# Patient Record
Sex: FEMALE | Race: WHITE | NOT HISPANIC OR LATINO | Employment: FULL TIME | URBAN - METROPOLITAN AREA
[De-identification: names, ages, dates, MRNs, and addresses within clinical notes are randomized per-mention and may not be internally consistent; named-entity substitution may affect disease eponyms.]

---

## 2022-06-24 ENCOUNTER — OFFICE VISIT (OUTPATIENT)
Dept: PRIMARY CARE CLINIC | Facility: CLINIC | Age: 42
End: 2022-06-24
Payer: MEDICAID

## 2022-06-24 VITALS
HEIGHT: 62 IN | TEMPERATURE: 97 F | RESPIRATION RATE: 20 BRPM | BODY MASS INDEX: 47.18 KG/M2 | HEART RATE: 64 BPM | OXYGEN SATURATION: 98 % | SYSTOLIC BLOOD PRESSURE: 126 MMHG | WEIGHT: 256.38 LBS | DIASTOLIC BLOOD PRESSURE: 60 MMHG

## 2022-06-24 DIAGNOSIS — Z13.39 ADHD (ATTENTION DEFICIT HYPERACTIVITY DISORDER) EVALUATION: ICD-10-CM

## 2022-06-24 DIAGNOSIS — Z12.31 BREAST CANCER SCREENING BY MAMMOGRAM: ICD-10-CM

## 2022-06-24 DIAGNOSIS — Z01.419 WELL WOMAN EXAM: ICD-10-CM

## 2022-06-24 DIAGNOSIS — Z11.59 ENCOUNTER FOR HEPATITIS C SCREENING TEST FOR LOW RISK PATIENT: ICD-10-CM

## 2022-06-24 DIAGNOSIS — E66.01 CLASS 3 SEVERE OBESITY WITHOUT SERIOUS COMORBIDITY WITH BODY MASS INDEX (BMI) OF 45.0 TO 49.9 IN ADULT, UNSPECIFIED OBESITY TYPE: ICD-10-CM

## 2022-06-24 DIAGNOSIS — Z11.4 ENCOUNTER FOR SCREENING FOR HIV: ICD-10-CM

## 2022-06-24 DIAGNOSIS — Z00.01 ENCOUNTER FOR GENERAL ADULT MEDICAL EXAMINATION WITH ABNORMAL FINDINGS: Primary | ICD-10-CM

## 2022-06-24 PROCEDURE — 3008F PR BODY MASS INDEX (BMI) DOCUMENTED: ICD-10-PCS | Mod: CPTII,,, | Performed by: FAMILY MEDICINE

## 2022-06-24 PROCEDURE — 3074F PR MOST RECENT SYSTOLIC BLOOD PRESSURE < 130 MM HG: ICD-10-PCS | Mod: CPTII,,, | Performed by: FAMILY MEDICINE

## 2022-06-24 PROCEDURE — 3074F SYST BP LT 130 MM HG: CPT | Mod: CPTII,,, | Performed by: FAMILY MEDICINE

## 2022-06-24 PROCEDURE — 3078F DIAST BP <80 MM HG: CPT | Mod: CPTII,,, | Performed by: FAMILY MEDICINE

## 2022-06-24 PROCEDURE — 99386 PR PREVENTIVE VISIT,NEW,40-64: ICD-10-PCS | Mod: S$PBB,,, | Performed by: FAMILY MEDICINE

## 2022-06-24 PROCEDURE — 99999 PR PBB SHADOW E&M-NEW PATIENT-LVL V: CPT | Mod: PBBFAC,,, | Performed by: FAMILY MEDICINE

## 2022-06-24 PROCEDURE — 99386 PREV VISIT NEW AGE 40-64: CPT | Mod: S$PBB,,, | Performed by: FAMILY MEDICINE

## 2022-06-24 PROCEDURE — 3008F BODY MASS INDEX DOCD: CPT | Mod: CPTII,,, | Performed by: FAMILY MEDICINE

## 2022-06-24 PROCEDURE — 99999 PR PBB SHADOW E&M-NEW PATIENT-LVL V: ICD-10-PCS | Mod: PBBFAC,,, | Performed by: FAMILY MEDICINE

## 2022-06-24 PROCEDURE — 1159F PR MEDICATION LIST DOCUMENTED IN MEDICAL RECORD: ICD-10-PCS | Mod: CPTII,,, | Performed by: FAMILY MEDICINE

## 2022-06-24 PROCEDURE — 99205 OFFICE O/P NEW HI 60 MIN: CPT | Mod: PBBFAC,PN | Performed by: FAMILY MEDICINE

## 2022-06-24 PROCEDURE — 1160F RVW MEDS BY RX/DR IN RCRD: CPT | Mod: CPTII,,, | Performed by: FAMILY MEDICINE

## 2022-06-24 PROCEDURE — 3078F PR MOST RECENT DIASTOLIC BLOOD PRESSURE < 80 MM HG: ICD-10-PCS | Mod: CPTII,,, | Performed by: FAMILY MEDICINE

## 2022-06-24 PROCEDURE — 1160F PR REVIEW ALL MEDS BY PRESCRIBER/CLIN PHARMACIST DOCUMENTED: ICD-10-PCS | Mod: CPTII,,, | Performed by: FAMILY MEDICINE

## 2022-06-24 PROCEDURE — 1159F MED LIST DOCD IN RCRD: CPT | Mod: CPTII,,, | Performed by: FAMILY MEDICINE

## 2022-06-24 NOTE — PROGRESS NOTES
"Subjective:       Patient ID: Dorian Ho is a 41 y.o. female.    Chief Complaint: annual and ADD         History of Present Illness:   Dorian Ho 41 y.o. female presents today with   Well Adult Physical: Patient here for a comprehensive physical exam.The patient reports problems - thinks she has ADD and anxiety and wants to be evaluated for ADD. Denies anyother sxs.  Do you take any herbs or supplements that were not prescribed by a doctor? no Are you taking calcium supplements? no Are you taking aspirin daily? not applicable   History:  Needs pap smear and mammo.  She has not seen a provider since her last child        History reviewed. No pertinent past medical history.  History reviewed. No pertinent family history.  Social History     Socioeconomic History    Marital status: Single   Tobacco Use    Smoking status: Never Smoker    Smokeless tobacco: Never Used     No outpatient encounter medications on file as of 6/24/2022.     No facility-administered encounter medications on file as of 6/24/2022.       Review of Systems    Review of Systems      A complete 10 point ROS was completed and are positive as per above HPI.    Otherwise negative for fever, diplopia, chest pain, shortness of breath, vomiting, blood in urine, joint pain, skin rash, seizures and unusual bleeding.     Objective:      /60 (BP Location: Left arm, Patient Position: Sitting, BP Method: Large (Manual))   Pulse 64   Temp 96.8 °F (36 °C) (Temporal)   Resp 20   Ht 5' 2" (1.575 m)   Wt 116.3 kg (256 lb 6.4 oz)   LMP 06/23/2022 (Exact Date)   SpO2 98%   BMI 46.90 kg/m²   Physical Exam  Psychiatric:         Attention and Perception: Attention normal.         Thought Content: Thought content is not paranoid or delusional. Thought content does not include homicidal or suicidal ideation. Thought content does not include homicidal or suicidal plan.         CONSTITUTIONAL: No apparent distress. Appears comfortable. Does not appear " acutely ill or septic. Appears adequately hydrated.  CARDIOVASCULAR: No perioral cyanosis  PULMONARY: Breathing unlabored. No retractions Chest expansion grossly normal.  PSYCHIATRIC: Alert and conversant and grossly oriented. Mood is grossly neutral. Affect appropriate. Judgment and insight grossly intact.  NEUROLOGIC: No focal sensory deficits reported.   No results found for this or any previous visit.  Assessment:       1. Encounter for general adult medical examination with abnormal findings    2. Encounter for screening for HIV    3. Encounter for hepatitis C screening test for low risk patient    4. Breast cancer screening by mammogram    5. Class 3 severe obesity without serious comorbidity with body mass index (BMI) of 45.0 to 49.9 in adult, unspecified obesity type    6. ADHD (attention deficit hyperactivity disorder) evaluation    7. Well woman exam        Plan:   Encounter for general adult medical examination with abnormal findings  -     CBC Auto Differential; Future; Expected date: 06/24/2022  -     Comprehensive Metabolic Panel; Future; Expected date: 06/24/2022  -     Lipid Panel; Future; Expected date: 06/24/2022    Encounter for screening for HIV  -     HIV 1/2 Ag/Ab (4th Gen); Future; Expected date: 06/24/2022    Encounter for hepatitis C screening test for low risk patient  -     Hepatitis C Antibody; Future; Expected date: 06/24/2022    Breast cancer screening by mammogram  -     Mammo Digital Screening Bilat w/ Vinnie; Future; Expected date: 06/24/2022    Class 3 severe obesity without serious comorbidity with body mass index (BMI) of 45.0 to 49.9 in adult, unspecified obesity type  -     Hemoglobin A1C; Future; Expected date: 06/24/2022  -     TSH; Future; Expected date: 06/24/2022    ADHD (attention deficit hyperactivity disorder) evaluation  -     Ambulatory referral/consult to Primary Care Behavioral Health (Non-Opioids); Future; Expected date: 07/01/2022    Well woman exam  -     Ambulatory  referral/consult to Gynecology; Future; Expected date: 07/01/2022    Benign exam  I have reviewed all of the patient's clinical history available in care everywhere and Epic and have utilized this in my evaluation and management recommendations today.   Treatment options and alternatives were discussed with the patient. Patient was given ample time to ask questions. All questions were answered. Voices understanding and acceptance of this advice. Will call back if any further questions or concerns.    Melissa Philippe MD

## 2022-06-27 ENCOUNTER — PATIENT OUTREACH (OUTPATIENT)
Dept: BEHAVIORAL HEALTH | Facility: CLINIC | Age: 42
End: 2022-06-27
Payer: MEDICAID

## 2022-06-27 NOTE — PROGRESS NOTES
Casework    Work Queue - Intake: Scheduled evaluation only with patient. Patient prefers to be seen in person.

## 2022-06-29 ENCOUNTER — LAB VISIT (OUTPATIENT)
Dept: LAB | Facility: HOSPITAL | Age: 42
End: 2022-06-29
Attending: FAMILY MEDICINE
Payer: MEDICAID

## 2022-06-29 DIAGNOSIS — E66.01 CLASS 3 SEVERE OBESITY WITHOUT SERIOUS COMORBIDITY WITH BODY MASS INDEX (BMI) OF 45.0 TO 49.9 IN ADULT, UNSPECIFIED OBESITY TYPE: ICD-10-CM

## 2022-06-29 DIAGNOSIS — Z11.59 ENCOUNTER FOR HEPATITIS C SCREENING TEST FOR LOW RISK PATIENT: ICD-10-CM

## 2022-06-29 DIAGNOSIS — Z00.01 ENCOUNTER FOR GENERAL ADULT MEDICAL EXAMINATION WITH ABNORMAL FINDINGS: ICD-10-CM

## 2022-06-29 DIAGNOSIS — Z11.4 ENCOUNTER FOR SCREENING FOR HIV: ICD-10-CM

## 2022-06-29 LAB
ALBUMIN SERPL BCP-MCNC: 3.4 G/DL (ref 3.5–5.2)
ALP SERPL-CCNC: 75 U/L (ref 55–135)
ALT SERPL W/O P-5'-P-CCNC: 8 U/L (ref 10–44)
ANION GAP SERPL CALC-SCNC: 8 MMOL/L (ref 8–16)
AST SERPL-CCNC: 10 U/L (ref 10–40)
BASOPHILS # BLD AUTO: 0.03 K/UL (ref 0–0.2)
BASOPHILS NFR BLD: 0.5 % (ref 0–1.9)
BILIRUB SERPL-MCNC: 0.3 MG/DL (ref 0.1–1)
BUN SERPL-MCNC: 11 MG/DL (ref 6–20)
CALCIUM SERPL-MCNC: 8.5 MG/DL (ref 8.7–10.5)
CHLORIDE SERPL-SCNC: 108 MMOL/L (ref 95–110)
CHOLEST SERPL-MCNC: 121 MG/DL (ref 120–199)
CHOLEST/HDLC SERPL: 3.2 {RATIO} (ref 2–5)
CO2 SERPL-SCNC: 23 MMOL/L (ref 23–29)
CREAT SERPL-MCNC: 0.6 MG/DL (ref 0.5–1.4)
DIFFERENTIAL METHOD: ABNORMAL
EOSINOPHIL # BLD AUTO: 0.2 K/UL (ref 0–0.5)
EOSINOPHIL NFR BLD: 2.7 % (ref 0–8)
ERYTHROCYTE [DISTWIDTH] IN BLOOD BY AUTOMATED COUNT: 13.7 % (ref 11.5–14.5)
EST. GFR  (AFRICAN AMERICAN): >60 ML/MIN/1.73 M^2
EST. GFR  (NON AFRICAN AMERICAN): >60 ML/MIN/1.73 M^2
ESTIMATED AVG GLUCOSE: 91 MG/DL (ref 68–131)
GLUCOSE SERPL-MCNC: 77 MG/DL (ref 70–110)
HBA1C MFR BLD: 4.8 % (ref 4–5.6)
HCT VFR BLD AUTO: 37.6 % (ref 37–48.5)
HDLC SERPL-MCNC: 38 MG/DL (ref 40–75)
HDLC SERPL: 31.4 % (ref 20–50)
HGB BLD-MCNC: 11.8 G/DL (ref 12–16)
IMM GRANULOCYTES # BLD AUTO: 0.02 K/UL (ref 0–0.04)
IMM GRANULOCYTES NFR BLD AUTO: 0.4 % (ref 0–0.5)
LDLC SERPL CALC-MCNC: 68.6 MG/DL (ref 63–159)
LYMPHOCYTES # BLD AUTO: 1.7 K/UL (ref 1–4.8)
LYMPHOCYTES NFR BLD: 30.1 % (ref 18–48)
MCH RBC QN AUTO: 26.2 PG (ref 27–31)
MCHC RBC AUTO-ENTMCNC: 31.4 G/DL (ref 32–36)
MCV RBC AUTO: 84 FL (ref 82–98)
MONOCYTES # BLD AUTO: 0.3 K/UL (ref 0.3–1)
MONOCYTES NFR BLD: 5.2 % (ref 4–15)
NEUTROPHILS # BLD AUTO: 3.4 K/UL (ref 1.8–7.7)
NEUTROPHILS NFR BLD: 61.1 % (ref 38–73)
NONHDLC SERPL-MCNC: 83 MG/DL
NRBC BLD-RTO: 0 /100 WBC
PLATELET # BLD AUTO: 368 K/UL (ref 150–450)
PMV BLD AUTO: 10.6 FL (ref 9.2–12.9)
POTASSIUM SERPL-SCNC: 4 MMOL/L (ref 3.5–5.1)
PROT SERPL-MCNC: 6.9 G/DL (ref 6–8.4)
RBC # BLD AUTO: 4.5 M/UL (ref 4–5.4)
SODIUM SERPL-SCNC: 139 MMOL/L (ref 136–145)
TRIGL SERPL-MCNC: 72 MG/DL (ref 30–150)
TSH SERPL DL<=0.005 MIU/L-ACNC: 0.94 UIU/ML (ref 0.4–4)
WBC # BLD AUTO: 5.54 K/UL (ref 3.9–12.7)

## 2022-06-29 PROCEDURE — 83036 HEMOGLOBIN GLYCOSYLATED A1C: CPT | Performed by: FAMILY MEDICINE

## 2022-06-29 PROCEDURE — 85025 COMPLETE CBC W/AUTO DIFF WBC: CPT | Performed by: FAMILY MEDICINE

## 2022-06-29 PROCEDURE — 86803 HEPATITIS C AB TEST: CPT | Performed by: FAMILY MEDICINE

## 2022-06-29 PROCEDURE — 87389 HIV-1 AG W/HIV-1&-2 AB AG IA: CPT | Performed by: FAMILY MEDICINE

## 2022-06-29 PROCEDURE — 84443 ASSAY THYROID STIM HORMONE: CPT | Performed by: FAMILY MEDICINE

## 2022-06-29 PROCEDURE — 80061 LIPID PANEL: CPT | Performed by: FAMILY MEDICINE

## 2022-06-29 PROCEDURE — 80053 COMPREHEN METABOLIC PANEL: CPT | Performed by: FAMILY MEDICINE

## 2022-06-29 PROCEDURE — 36415 COLL VENOUS BLD VENIPUNCTURE: CPT | Mod: PN | Performed by: FAMILY MEDICINE

## 2022-06-30 ENCOUNTER — TELEPHONE (OUTPATIENT)
Dept: PRIMARY CARE CLINIC | Facility: CLINIC | Age: 42
End: 2022-06-30
Payer: MEDICAID

## 2022-06-30 LAB
HCV AB SERPL QL IA: NEGATIVE
HIV 1+2 AB+HIV1 P24 AG SERPL QL IA: NEGATIVE

## 2022-06-30 NOTE — TELEPHONE ENCOUNTER
----- Message from Melissa Philippe MD sent at 6/30/2022  8:25 AM CDT -----  I have reviewed all your lab results.   Blood count slight anemia,   kidney function, liver function, electrolytes, cholesterol and thyroid function are all normal.  Your A1C is normal, therefore you do not have diabetes.  If you have an appt for result review, please keep it, so we can discuss details and plan of care.  Please do not hesitate to contact us if you have any questions.

## 2022-06-30 NOTE — PROGRESS NOTES
I have reviewed all your lab results.   Blood count slight anemia,   kidney function, liver function, electrolytes, cholesterol and thyroid function are all normal.  Your A1C is normal, therefore you do not have diabetes.  If you have an appt for result review, please keep it, so we can discuss details and plan of care.  Please do not hesitate to contact us if you have any questions.

## 2022-07-06 NOTE — PROGRESS NOTES
Age appropriate HIV screen is negative   Age appropriate Hep C screen is negative. No further action required.

## 2022-07-12 ENCOUNTER — PATIENT OUTREACH (OUTPATIENT)
Dept: BEHAVIORAL HEALTH | Facility: CLINIC | Age: 42
End: 2022-07-12
Payer: MEDICAID

## 2022-07-13 ENCOUNTER — HOSPITAL ENCOUNTER (OUTPATIENT)
Dept: RADIOLOGY | Facility: HOSPITAL | Age: 42
Discharge: HOME | End: 2022-07-13
Attending: FAMILY MEDICINE
Payer: MEDICAID

## 2022-07-13 ENCOUNTER — TELEPHONE (OUTPATIENT)
Dept: PRIMARY CARE CLINIC | Facility: CLINIC | Age: 42
End: 2022-07-13
Payer: MEDICAID

## 2022-07-13 ENCOUNTER — OFFICE VISIT (OUTPATIENT)
Dept: BEHAVIORAL HEALTH | Facility: CLINIC | Age: 42
End: 2022-07-13
Payer: MEDICAID

## 2022-07-13 VITALS — HEIGHT: 62 IN | WEIGHT: 255.75 LBS | BODY MASS INDEX: 47.06 KG/M2

## 2022-07-13 DIAGNOSIS — F41.9 ANXIETY AND DEPRESSION: Primary | ICD-10-CM

## 2022-07-13 DIAGNOSIS — Z12.31 BREAST CANCER SCREENING BY MAMMOGRAM: ICD-10-CM

## 2022-07-13 DIAGNOSIS — F32.A ANXIETY AND DEPRESSION: Primary | ICD-10-CM

## 2022-07-13 DIAGNOSIS — F90.9 ATTENTION DEFICIT HYPERACTIVITY DISORDER (ADHD), UNSPECIFIED ADHD TYPE: ICD-10-CM

## 2022-07-13 PROCEDURE — 77067 SCR MAMMO BI INCL CAD: CPT | Mod: TC

## 2022-07-13 PROCEDURE — 90791 PSYCH DIAGNOSTIC EVALUATION: CPT | Mod: AJ,HB,, | Performed by: SOCIAL WORKER

## 2022-07-13 PROCEDURE — 99999 PR PBB SHADOW E&M-EST. PATIENT-LVL I: ICD-10-PCS | Mod: PBBFAC,,, | Performed by: SOCIAL WORKER

## 2022-07-13 PROCEDURE — 77063 BREAST TOMOSYNTHESIS BI: CPT | Mod: 26,,, | Performed by: RADIOLOGY

## 2022-07-13 PROCEDURE — 3044F HG A1C LEVEL LT 7.0%: CPT | Mod: CPTII,,, | Performed by: SOCIAL WORKER

## 2022-07-13 PROCEDURE — 77067 MAMMO DIGITAL SCREENING BILAT WITH TOMO: ICD-10-PCS | Mod: 26,,, | Performed by: RADIOLOGY

## 2022-07-13 PROCEDURE — 3044F PR MOST RECENT HEMOGLOBIN A1C LEVEL <7.0%: ICD-10-PCS | Mod: CPTII,,, | Performed by: SOCIAL WORKER

## 2022-07-13 PROCEDURE — 99211 OFF/OP EST MAY X REQ PHY/QHP: CPT | Mod: PBBFAC,PN | Performed by: SOCIAL WORKER

## 2022-07-13 PROCEDURE — 77063 MAMMO DIGITAL SCREENING BILAT WITH TOMO: ICD-10-PCS | Mod: 26,,, | Performed by: RADIOLOGY

## 2022-07-13 PROCEDURE — 90791 PR PSYCHIATRIC DIAGNOSTIC EVALUATION: ICD-10-PCS | Mod: AJ,HB,, | Performed by: SOCIAL WORKER

## 2022-07-13 PROCEDURE — 77067 SCR MAMMO BI INCL CAD: CPT | Mod: 26,,, | Performed by: RADIOLOGY

## 2022-07-13 PROCEDURE — 99999 PR PBB SHADOW E&M-EST. PATIENT-LVL I: CPT | Mod: PBBFAC,,, | Performed by: SOCIAL WORKER
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
## 2022-07-13 NOTE — TELEPHONE ENCOUNTER
Returned call to patient informed her message was previously sent to the provider. She voiced understanding.

## 2022-07-14 ENCOUNTER — TELEPHONE (OUTPATIENT)
Dept: PRIMARY CARE CLINIC | Facility: CLINIC | Age: 42
End: 2022-07-14
Payer: MEDICAID

## 2022-07-18 ENCOUNTER — TELEPHONE (OUTPATIENT)
Dept: PRIMARY CARE CLINIC | Facility: CLINIC | Age: 42
End: 2022-07-18
Payer: MEDICAID

## 2022-07-18 NOTE — TELEPHONE ENCOUNTER
Patient called regarding mammo. Patient was informed of results , patient voiced understanding no further actions was needed.

## 2022-08-03 ENCOUNTER — TELEPHONE (OUTPATIENT)
Dept: PRIMARY CARE CLINIC | Facility: CLINIC | Age: 42
End: 2022-08-03

## 2022-08-03 ENCOUNTER — OFFICE VISIT (OUTPATIENT)
Dept: PRIMARY CARE CLINIC | Facility: CLINIC | Age: 42
End: 2022-08-03
Payer: MEDICAID

## 2022-08-03 DIAGNOSIS — F90.0 ADHD (ATTENTION DEFICIT HYPERACTIVITY DISORDER), INATTENTIVE TYPE: ICD-10-CM

## 2022-08-03 DIAGNOSIS — F32.A ANXIETY AND DEPRESSION: Primary | ICD-10-CM

## 2022-08-03 DIAGNOSIS — F41.9 ANXIETY AND DEPRESSION: Primary | ICD-10-CM

## 2022-08-03 DIAGNOSIS — E66.01 CLASS 3 SEVERE OBESITY WITHOUT SERIOUS COMORBIDITY WITH BODY MASS INDEX (BMI) OF 45.0 TO 49.9 IN ADULT, UNSPECIFIED OBESITY TYPE: ICD-10-CM

## 2022-08-03 PROCEDURE — 99214 PR OFFICE/OUTPT VISIT, EST, LEVL IV, 30-39 MIN: ICD-10-PCS | Mod: 95,,, | Performed by: FAMILY MEDICINE

## 2022-08-03 PROCEDURE — 3044F PR MOST RECENT HEMOGLOBIN A1C LEVEL <7.0%: ICD-10-PCS | Mod: CPTII,95,, | Performed by: FAMILY MEDICINE

## 2022-08-03 PROCEDURE — 99214 OFFICE O/P EST MOD 30 MIN: CPT | Mod: 95,,, | Performed by: FAMILY MEDICINE

## 2022-08-03 PROCEDURE — 3044F HG A1C LEVEL LT 7.0%: CPT | Mod: CPTII,95,, | Performed by: FAMILY MEDICINE

## 2022-08-03 NOTE — PROGRESS NOTES
Subjective:    The patient location is: [place] at home  Visit type: Virtual visit with synchronous audio and video  Total time spent with patient: 30 mins  Each patient to whom he or she provides medical services by telemedicine is:  (1) informed of the relationship between the physician and patient and the respective role of any other health care provider with respect to management of the patient; and (2) notified that he or she may decline to receive medical services by telemedicine and may withdraw from such care at any time.       Patient ID: Dorian Ho is a 41 y.o. female.    Chief Complaint: ADHD, Anx and depression      HPI   History of Present Illness:   Dorian Ho 41 y.o. female presents today with   I have reviewed all of the patient's clinical history available in care everywhere and Epic and have utilized this in my evaluation and management recommendations today.   She has been screened by LCSW and pos for anx, depression and ADHD-unspecified. Per history, she had ADHD-in attentive.  Would like to continue CBT for now, its controlling her sxs. And will return in 6 mons for re-evaluation.    Labs reviewed and next lab in 06/23. Mammo was negative and she would like to do it yearly.  She is scheduled for pap.  The ASCVD Risk score (Mindi DC Jr., et al., 2013) failed to calculate for the following reasons:    The valid total cholesterol range is 130 to 320 mg/dL  No past medical history on file.  No family history on file.  Social History     Socioeconomic History    Marital status: Single   Tobacco Use    Smoking status: Never Smoker    Smokeless tobacco: Never Used     No outpatient encounter medications on file as of 8/3/2022.     No facility-administered encounter medications on file as of 8/3/2022.       Review of Systems   Constitutional: Negative for activity change and unexpected weight change.   HENT: Negative for hearing loss, rhinorrhea and trouble swallowing.    Eyes: Negative for  discharge and visual disturbance.   Respiratory: Negative for chest tightness and wheezing.    Cardiovascular: Negative for chest pain and palpitations.   Gastrointestinal: Negative for blood in stool, constipation, diarrhea and vomiting.   Endocrine: Negative for polydipsia and polyuria.   Genitourinary: Negative for difficulty urinating, dysuria, hematuria and menstrual problem.   Musculoskeletal: Negative for arthralgias, joint swelling and neck pain.   Neurological: Negative for weakness and headaches.   Psychiatric/Behavioral: Negative for confusion and dysphoric mood.         Objective:      There were no vitals taken for this visit.  Physical Exam    CONSTITUTIONAL: No apparent distress. Appears comfortable. Does not appear acutely ill or septic. Appears adequately hydrated.  CARDIOVASCULAR: No perioral cyanosis  PULMONARY: Breathing unlabored. No retractions Chest expansion grossly normal.  PSYCHIATRIC: Alert and conversant and grossly oriented. Mood is grossly neutral. Affect appropriate. Judgment and insight grossly intact.  NEUROLOGIC: No focal sensory deficits reported.   Results for orders placed or performed in visit on 06/29/22   CBC Auto Differential   Result Value Ref Range    WBC 5.54 3.90 - 12.70 K/uL    RBC 4.50 4.00 - 5.40 M/uL    Hemoglobin 11.8 (L) 12.0 - 16.0 g/dL    Hematocrit 37.6 37.0 - 48.5 %    MCV 84 82 - 98 fL    MCH 26.2 (L) 27.0 - 31.0 pg    MCHC 31.4 (L) 32.0 - 36.0 g/dL    RDW 13.7 11.5 - 14.5 %    Platelets 368 150 - 450 K/uL    MPV 10.6 9.2 - 12.9 fL    Immature Granulocytes 0.4 0.0 - 0.5 %    Gran # (ANC) 3.4 1.8 - 7.7 K/uL    Immature Grans (Abs) 0.02 0.00 - 0.04 K/uL    Lymph # 1.7 1.0 - 4.8 K/uL    Mono # 0.3 0.3 - 1.0 K/uL    Eos # 0.2 0.0 - 0.5 K/uL    Baso # 0.03 0.00 - 0.20 K/uL    nRBC 0 0 /100 WBC    Gran % 61.1 38.0 - 73.0 %    Lymph % 30.1 18.0 - 48.0 %    Mono % 5.2 4.0 - 15.0 %    Eosinophil % 2.7 0.0 - 8.0 %    Basophil % 0.5 0.0 - 1.9 %    Differential Method  Automated    Comprehensive Metabolic Panel   Result Value Ref Range    Sodium 139 136 - 145 mmol/L    Potassium 4.0 3.5 - 5.1 mmol/L    Chloride 108 95 - 110 mmol/L    CO2 23 23 - 29 mmol/L    Glucose 77 70 - 110 mg/dL    BUN 11 6 - 20 mg/dL    Creatinine 0.6 0.5 - 1.4 mg/dL    Calcium 8.5 (L) 8.7 - 10.5 mg/dL    Total Protein 6.9 6.0 - 8.4 g/dL    Albumin 3.4 (L) 3.5 - 5.2 g/dL    Total Bilirubin 0.3 0.1 - 1.0 mg/dL    Alkaline Phosphatase 75 55 - 135 U/L    AST 10 10 - 40 U/L    ALT 8 (L) 10 - 44 U/L    Anion Gap 8 8 - 16 mmol/L    eGFR if African American >60.0 >60 mL/min/1.73 m^2    eGFR if non African American >60.0 >60 mL/min/1.73 m^2   Lipid Panel   Result Value Ref Range    Cholesterol 121 120 - 199 mg/dL    Triglycerides 72 30 - 150 mg/dL    HDL 38 (L) 40 - 75 mg/dL    LDL Cholesterol 68.6 63.0 - 159.0 mg/dL    HDL/Cholesterol Ratio 31.4 20.0 - 50.0 %    Total Cholesterol/HDL Ratio 3.2 2.0 - 5.0    Non-HDL Cholesterol 83 mg/dL   Hemoglobin A1C   Result Value Ref Range    Hemoglobin A1C 4.8 4.0 - 5.6 %    Estimated Avg Glucose 91 68 - 131 mg/dL   TSH   Result Value Ref Range    TSH 0.937 0.400 - 4.000 uIU/mL   Hepatitis C Antibody   Result Value Ref Range    Hepatitis C Ab Negative Negative   HIV 1/2 Ag/Ab (4th Gen)   Result Value Ref Range    HIV 1/2 Ag/Ab Negative Negative     Assessment:       1. Anxiety and depression    2. ADHD (attention deficit hyperactivity disorder), inattentive type    3. Class 3 severe obesity without serious comorbidity with body mass index (BMI) of 45.0 to 49.9 in adult, unspecified obesity type    4. BMI 45.0-49.9, adult        Plan:   Anxiety and depression    ADHD (attention deficit hyperactivity disorder), inattentive type    Class 3 severe obesity without serious comorbidity with body mass index (BMI) of 45.0 to 49.9 in adult, unspecified obesity type    BMI 45.0-49.9, adult      You will be able to continue therapy with GILA Galo and someone will be in touch to schedule  you.   Remember healthy self care:   Eat right  Attempt adequate rest   HANDWASHING / encourage such honey. During this corona virus time   Walk or light exercise within reason  Read or explore any of reference materials mentioned  Reach out (I.e.,  connect with)  others who nuture and bring out best in you  Avoid risky behaviors  Keep your appointments  IF you cannot make your appt THEN please call or go online to reschedule.  Avoid  alcohol and illicit substances.  Look for the positive.  All is often relative-seek balance  Call sooner if needed : [phone]   Call 911 or go to Emergency Room  (ER)  if any acute concerns     Psych number [phone]    Or  Our office at [phone]    Chronic Condition, Stable, latest lab result reviewed, medications reviewed-no side effects, continue current regimen.   Treatment options and alternatives were discussed with the patient. Patient was given ample time to ask questions. All questions were answered. Voices understanding and acceptance of this advice. Will call back if any further questions or concerns.  Melissa Philippe MD

## 2022-08-08 ENCOUNTER — PATIENT OUTREACH (OUTPATIENT)
Dept: BEHAVIORAL HEALTH | Facility: CLINIC | Age: 42
End: 2022-08-08
Payer: MEDICAID

## 2022-08-27 ENCOUNTER — OFFICE VISIT (OUTPATIENT)
Dept: BEHAVIORAL HEALTH | Facility: CLINIC | Age: 42
End: 2022-08-27
Payer: MEDICAID

## 2022-08-27 ENCOUNTER — PATIENT MESSAGE (OUTPATIENT)
Dept: BEHAVIORAL HEALTH | Facility: CLINIC | Age: 42
End: 2022-08-27

## 2022-08-27 DIAGNOSIS — F41.9 ANXIETY AND DEPRESSION: Primary | ICD-10-CM

## 2022-08-27 DIAGNOSIS — F90.9 ATTENTION DEFICIT HYPERACTIVITY DISORDER (ADHD), UNSPECIFIED ADHD TYPE: ICD-10-CM

## 2022-08-27 DIAGNOSIS — F32.A ANXIETY AND DEPRESSION: Primary | ICD-10-CM

## 2022-08-27 DIAGNOSIS — F43.10 PTSD (POST-TRAUMATIC STRESS DISORDER): ICD-10-CM

## 2022-08-27 PROCEDURE — 3044F HG A1C LEVEL LT 7.0%: CPT | Mod: CPTII,95,AJ,HB | Performed by: SOCIAL WORKER

## 2022-08-27 PROCEDURE — 90834 PR PSYCHOTHERAPY W/PATIENT, 45 MIN: ICD-10-PCS | Mod: 95,AJ,HB, | Performed by: SOCIAL WORKER

## 2022-08-27 PROCEDURE — 3044F PR MOST RECENT HEMOGLOBIN A1C LEVEL <7.0%: ICD-10-PCS | Mod: CPTII,95,AJ,HB | Performed by: SOCIAL WORKER

## 2022-08-27 PROCEDURE — 90834 PSYTX W PT 45 MINUTES: CPT | Mod: 95,AJ,HB, | Performed by: SOCIAL WORKER

## 2022-08-27 NOTE — PROGRESS NOTES
Individual Psychotherapy Follow-Up (PhD/LCSW)    8/27/2022    Site:  Telemed        Due to the nature of this visit type, a virtual visit with synchronous audio and video, each patient to whom this provider administers behavioral health services by telemedicine is: (1) informed of the relationship between the provider and patient and the respective role of any other health care provider with respect to management of the patient; and (2) notified that he or she may decline to receive services by telemedicine and may withdraw from such care at any time.    The patient was informed of the following:     Provider's contact info:  [facility]  [address], [address]  [place] [address]  (Phone) [phone]    If technology issues occur, call office phone: : [phone]  If crisis: Dial 911 or go to nearest Emergency Room (ER)  If questions related to privacy practices: contact [facility] Information Department: [phone]    For security purposes, the pt identified that they were at [address]  [address]  [place] [address] during today's session and contact number is [phone] (home) .    The pt's emergency contact(s) is Extended Emergency Contact Information  Primary Emergency Contact: Ho Caryl  Address: [address]           [address]           [place] [address] [facility]  Home Phone: [phone]  Mobile Phone: [phone]  Relation: Sister  Preferred language: English   needed? No.    Crisis Disclaimer: Patient was informed that due to the virtual nature of the visit, that if a crisis develops, protocols will be implemented to ensure patient safety, including but not limited to: 1) Initiating a welfare check with local Law Enforcement and/or 2) Calling [phone]    Therapeutic Intervention: Met with patient.  Outpatient - Supportive psychotherapy 45 min - CPT Code 04632    Chief complaint/reason for encounter: attention deficit,  depression, anxiety, and interpersonal                Interval history and content of current session: Pt logged in from home. She is casually dressed, hair dyed blue, in good spirits. She started out the visit with talking about getting a pay cut a work; allowed her to talk about and validated her frustrations. She talked about her son and daughter--they are doing okay, could be better. She is really struggling with ability to focus/manage things better. As she spoke about things she verbalized a lot of fear/worry/anxiety. Spoke a lot about her difficult childhood and trauma, anger about it. Again, validated her feelings. We talked about taking things one day at a time, one step at a time--doing nothing means nothing will change and at the same time acknowledging how difficult change is. Will send her resources that she can use to help with change/managing better without medication but also encouraged her to consider keeping the option of medication assistance open if she struggles with progress on her own.     PHQ-9 Depression Patient Health Questionnaire 8/24/2022   Patient agreed to terms: Yes   Little interest or pleasure in doing things 1   Feeling down, depressed, or hopeless 1   Trouble falling or staying asleep, or sleeping too much 1   Feeling tired or having little energy 1   Poor appetite or overeating 1   Feeling bad about yourself - or that you are a failure or have let yourself or your family down 1   Trouble concentrating on things, such as reading the newspaper or watching television 1   Moving or speaking so slowly that other people could have noticed. Or the opposite - being so fidgety or restless that you have been moving around a lot more than usual 1   Thoughts that you would be better off dead, or of hurting yourself in some way 0   PHQ-9 Total Score 8   If you checked off any problems, how difficult have these problems made it for you to do your work, take care of things at home, or get along  with other people? Somewhat difficult   Interpretation Mild       0-4 = No intervention  5 to 9 = Mild  10 to 14 = Moderate  15 to 19 = Moderately severe  ?20 = Severe    GAD7 8/24/2022   1. Feeling nervous, anxious, or on edge? 3   2. Not being able to stop or control worrying? 2   3. Worrying too much about different things? 2   4. Trouble relaxing? 2   5. Being so restless that it is hard to sit still? 2   6. Becoming easily annoyed or irritable? 2   7. Feeling afraid as if something awful might happen? 1   8. If you checked off any problems, how difficult have these problems made it for you to do your work, take care of things at home, or get along with other people? 2   HALIE-7 Score 14     0-4 = Minimal anxiety  5-9 = Mild anxiety  10-14 = Moderate anxiety  15-21 = Severe anxiety     Treatment plan:  Target symptoms: depression, anxiety   Why chosen therapy is appropriate versus another modality: relevant to diagnosis, patient responds to this modality, evidence based practice  Outcome monitoring methods: self-report, observation, checklist/rating scale  Therapeutic intervention type: behavior modifying psychotherapy, supportive psychotherapy    Risk parameters:  Patient reports no suicidal ideation  Patient reports no homicidal ideation  Patient reports no self-injurious behavior  Patient reports no violent behavior    Verbal deficits: None    Patient's response to intervention:  The patient's response to intervention is accepting.    Progress toward goals and other mental status changes:  The patient's progress toward goals is limited.    Diagnosis:    ICD-10-CM ICD-9-CM   1. Anxiety and depression  F41.9 300.00    F32.A 311   2. Attention deficit hyperactivity disorder (ADHD), unspecified ADHD type  F90.9 314.01   3. PTSD (post-traumatic stress disorder)  F43.10 309.81       Plan:  individual psychotherapy and medication management by physician    Return to clinic:  6 weeks    Length of Service (minutes):  45

## 2022-09-12 ENCOUNTER — OFFICE VISIT (OUTPATIENT)
Dept: OBSTETRICS AND GYNECOLOGY | Facility: CLINIC | Age: 42
End: 2022-09-12
Payer: MEDICAID

## 2022-09-12 VITALS
DIASTOLIC BLOOD PRESSURE: 84 MMHG | SYSTOLIC BLOOD PRESSURE: 114 MMHG | HEIGHT: 62 IN | WEIGHT: 254.38 LBS | BODY MASS INDEX: 46.81 KG/M2

## 2022-09-12 DIAGNOSIS — Z01.419 WELL WOMAN EXAM: ICD-10-CM

## 2022-09-12 DIAGNOSIS — Z01.419 ENCOUNTER FOR GYNECOLOGICAL EXAMINATION WITHOUT ABNORMAL FINDING: Primary | ICD-10-CM

## 2022-09-12 PROCEDURE — 3008F PR BODY MASS INDEX (BMI) DOCUMENTED: ICD-10-PCS | Mod: CPTII,,, | Performed by: NURSE PRACTITIONER

## 2022-09-12 PROCEDURE — 88141 CYTOPATH C/V INTERPRET: CPT | Mod: ,,, | Performed by: PATHOLOGY

## 2022-09-12 PROCEDURE — 3008F BODY MASS INDEX DOCD: CPT | Mod: CPTII,,, | Performed by: NURSE PRACTITIONER

## 2022-09-12 PROCEDURE — 99999 PR PBB SHADOW E&M-EST. PATIENT-LVL III: CPT | Mod: PBBFAC,,, | Performed by: NURSE PRACTITIONER

## 2022-09-12 PROCEDURE — 99386 PR PREVENTIVE VISIT,NEW,40-64: ICD-10-PCS | Mod: S$PBB,,, | Performed by: NURSE PRACTITIONER

## 2022-09-12 PROCEDURE — 99213 OFFICE O/P EST LOW 20 MIN: CPT | Mod: PBBFAC,PN | Performed by: NURSE PRACTITIONER

## 2022-09-12 PROCEDURE — 99386 PREV VISIT NEW AGE 40-64: CPT | Mod: S$PBB,,, | Performed by: NURSE PRACTITIONER

## 2022-09-12 PROCEDURE — 3079F DIAST BP 80-89 MM HG: CPT | Mod: CPTII,,, | Performed by: NURSE PRACTITIONER

## 2022-09-12 PROCEDURE — 88175 CYTOPATH C/V AUTO FLUID REDO: CPT | Performed by: PATHOLOGY

## 2022-09-12 PROCEDURE — 1160F RVW MEDS BY RX/DR IN RCRD: CPT | Mod: CPTII,,, | Performed by: NURSE PRACTITIONER

## 2022-09-12 PROCEDURE — 1159F MED LIST DOCD IN RCRD: CPT | Mod: CPTII,,, | Performed by: NURSE PRACTITIONER

## 2022-09-12 PROCEDURE — 99999 PR PBB SHADOW E&M-EST. PATIENT-LVL III: ICD-10-PCS | Mod: PBBFAC,,, | Performed by: NURSE PRACTITIONER

## 2022-09-12 PROCEDURE — 3079F PR MOST RECENT DIASTOLIC BLOOD PRESSURE 80-89 MM HG: ICD-10-PCS | Mod: CPTII,,, | Performed by: NURSE PRACTITIONER

## 2022-09-12 PROCEDURE — 88141 PR  CYTOPATH CERV/VAG INTERPRET: ICD-10-PCS | Mod: ,,, | Performed by: PATHOLOGY

## 2022-09-12 PROCEDURE — 3074F SYST BP LT 130 MM HG: CPT | Mod: CPTII,,, | Performed by: NURSE PRACTITIONER

## 2022-09-12 PROCEDURE — 3074F PR MOST RECENT SYSTOLIC BLOOD PRESSURE < 130 MM HG: ICD-10-PCS | Mod: CPTII,,, | Performed by: NURSE PRACTITIONER

## 2022-09-12 PROCEDURE — 1159F PR MEDICATION LIST DOCUMENTED IN MEDICAL RECORD: ICD-10-PCS | Mod: CPTII,,, | Performed by: NURSE PRACTITIONER

## 2022-09-12 PROCEDURE — 87624 HPV HI-RISK TYP POOLED RSLT: CPT | Performed by: NURSE PRACTITIONER

## 2022-09-12 PROCEDURE — 1160F PR REVIEW ALL MEDS BY PRESCRIBER/CLIN PHARMACIST DOCUMENTED: ICD-10-PCS | Mod: CPTII,,, | Performed by: NURSE PRACTITIONER

## 2022-09-12 PROCEDURE — 3044F PR MOST RECENT HEMOGLOBIN A1C LEVEL <7.0%: ICD-10-PCS | Mod: CPTII,,, | Performed by: NURSE PRACTITIONER

## 2022-09-12 PROCEDURE — 3044F HG A1C LEVEL LT 7.0%: CPT | Mod: CPTII,,, | Performed by: NURSE PRACTITIONER

## 2022-09-12 RX ORDER — IBUPROFEN 800 MG/1
TABLET ORAL
COMMUNITY

## 2022-09-12 NOTE — PROGRESS NOTES
"CC: Well woman exam    Dorian Ho is a 41 y.o. female  presents for well woman exam.  LMP: Patient's last menstrual period was 2022 (exact date)..    No issues, problems, or complaints.    Last pap about 20 yrs ago, no issue  Monthly cycle 4-5 days first 1-2 days heavy  Mmg done and normal     Past Medical History:   Diagnosis Date    Anemia      Past Surgical History:   Procedure Laterality Date     SECTION       Social History     Socioeconomic History    Marital status: Single   Tobacco Use    Smoking status: Never    Smokeless tobacco: Never   Substance and Sexual Activity    Alcohol use: Never    Drug use: Never    Sexual activity: Yes     Partners: Male     Birth control/protection: Condom     Family History   Problem Relation Age of Onset    Diabetes Maternal Grandmother     Diabetes Mother     Diabetes Paternal Uncle     Cancer Maternal Uncle      OB History          3    Para   2    Term   2            AB   1    Living   2         SAB   1    IAB        Ectopic        Multiple        Live Births   2                 /84   Ht 5' 2" (1.575 m)   Wt 115.4 kg (254 lb 6.4 oz)   LMP 2022 (Exact Date)   BMI 46.53 kg/m²       ROS:  GENERAL: Denies weight gain or weight loss. Feeling well overall.   SKIN: Denies rash or lesions.   HEAD: Denies head injury or headache.   NODES: Denies enlarged lymph nodes.   CHEST: Denies chest pain or shortness of breath.   CARDIOVASCULAR: Denies palpitations or left sided chest pain.   ABDOMEN: No abdominal pain, constipation, diarrhea, nausea, vomiting or rectal bleeding.   URINARY: No frequency, dysuria, hematuria, or burning on urination.  REPRODUCTIVE: See HPI.   BREASTS: The patient performs breast self-examination and denies pain, lumps, or nipple discharge.   HEMATOLOGIC: No easy bruisability or excessive bleeding.   MUSCULOSKELETAL: Denies joint pain or swelling.   NEUROLOGIC: Denies syncope or weakness.   PSYCHIATRIC: Denies " depression, anxiety or mood swings.    PHYSICAL EXAM:  APPEARANCE: Well nourished, well developed, in no acute distress.  AFFECT: WNL, alert and oriented x 3  SKIN: No acne or hirsutism  NECK: Neck symmetric without masses or thyromegaly  NODES: No inguinal, cervical, axillary, or femoral lymph node enlargement  CHEST: Good respiratory effect  ABDOMEN: Soft.  No tenderness or masses.  No hepatosplenomegaly.  No hernias.  BREASTS: Symmetrical, no skin changes or visible lesions.  No palpable masses, nipple discharge bilaterally.  PELVIC: Normal external genitalia without lesions.  Normal hair distribution.  Adequate perineal body, normal urethral meatus.  Vagina moist and well rugated without lesions or discharge.  Cervix pink, without lesions, discharge or tenderness.  No significant cystocele or rectocele.  Bimanual exam shows uterus to be normal size, regular, mobile and nontender.  Adnexa without masses or tenderness.    EXTREMITIES: No edema.  Physical Exam    1. Encounter for gynecological examination without abnormal finding  Liquid-Based Pap Smear, Screening    HPV High Risk Genotypes, PCR      2. Well woman exam  Ambulatory referral/consult to Gynecology       AND PLAN:    Patient was counseled today on A.C.S. Pap guidelines and recommendations for yearly pelvic exams, mammograms and monthly self breast exams; to see her PCP for other health maintenance.

## 2022-09-16 ENCOUNTER — PATIENT MESSAGE (OUTPATIENT)
Dept: PRIMARY CARE CLINIC | Facility: CLINIC | Age: 42
End: 2022-09-16
Payer: MEDICAID

## 2022-09-16 LAB
FINAL PATHOLOGIC DIAGNOSIS: ABNORMAL
HPV HR 12 DNA SPEC QL NAA+PROBE: POSITIVE
HPV16 AG SPEC QL: NEGATIVE
HPV18 DNA SPEC QL NAA+PROBE: NEGATIVE
Lab: ABNORMAL

## 2022-09-25 NOTE — PROGRESS NOTES
Your pap result is abnormal.   @staff: verify that she has follow up appt with Gyn for plan of care. Thank you

## 2022-10-07 ENCOUNTER — PATIENT MESSAGE (OUTPATIENT)
Dept: OBSTETRICS AND GYNECOLOGY | Facility: CLINIC | Age: 42
End: 2022-10-07

## 2022-10-07 ENCOUNTER — PROCEDURE VISIT (OUTPATIENT)
Dept: OBSTETRICS AND GYNECOLOGY | Facility: CLINIC | Age: 42
End: 2022-10-07
Payer: MEDICAID

## 2022-10-07 VITALS
BODY MASS INDEX: 45.97 KG/M2 | HEIGHT: 62 IN | WEIGHT: 249.81 LBS | SYSTOLIC BLOOD PRESSURE: 128 MMHG | DIASTOLIC BLOOD PRESSURE: 89 MMHG

## 2022-10-07 DIAGNOSIS — N88.2 CERVICAL STENOSIS (UTERINE CERVIX): ICD-10-CM

## 2022-10-07 DIAGNOSIS — Z32.00 POSSIBLE PREGNANCY: Primary | ICD-10-CM

## 2022-10-07 DIAGNOSIS — R87.619 ATYPICAL GLANDULAR CELLS OF UNDETERMINED SIGNIFICANCE (AGUS) ON CERVICAL PAP SMEAR: ICD-10-CM

## 2022-10-07 LAB
B-HCG UR QL: NEGATIVE
CTP QC/QA: YES

## 2022-10-07 PROCEDURE — 57454 BX/CURETT OF CERVIX W/SCOPE: CPT | Mod: PBBFAC | Performed by: NURSE PRACTITIONER

## 2022-10-07 PROCEDURE — 57454 COLPOSCOPY: ICD-10-PCS | Mod: S$PBB,,, | Performed by: NURSE PRACTITIONER

## 2022-10-07 PROCEDURE — 81025 URINE PREGNANCY TEST: CPT | Mod: PBBFAC | Performed by: NURSE PRACTITIONER

## 2022-10-07 PROCEDURE — 88305 TISSUE EXAM BY PATHOLOGIST: CPT | Mod: 59 | Performed by: PATHOLOGY

## 2022-10-07 PROCEDURE — 57454 BX/CURETT OF CERVIX W/SCOPE: CPT | Mod: S$PBB,,, | Performed by: NURSE PRACTITIONER

## 2022-10-07 PROCEDURE — 88305 TISSUE EXAM BY PATHOLOGIST: ICD-10-PCS | Mod: 26,,, | Performed by: PATHOLOGY

## 2022-10-07 PROCEDURE — 88305 TISSUE EXAM BY PATHOLOGIST: CPT | Mod: 26,,, | Performed by: PATHOLOGY

## 2022-10-07 RX ORDER — MISOPROSTOL 200 UG/1
TABLET ORAL
Qty: 4 TABLET | Refills: 0 | Status: SHIPPED | OUTPATIENT
Start: 2022-10-07 | End: 2022-11-28 | Stop reason: SDUPTHER

## 2022-10-08 ENCOUNTER — OFFICE VISIT (OUTPATIENT)
Dept: BEHAVIORAL HEALTH | Facility: CLINIC | Age: 42
End: 2022-10-08
Payer: MEDICAID

## 2022-10-08 DIAGNOSIS — F43.10 PTSD (POST-TRAUMATIC STRESS DISORDER): ICD-10-CM

## 2022-10-08 DIAGNOSIS — F32.A ANXIETY AND DEPRESSION: Primary | ICD-10-CM

## 2022-10-08 DIAGNOSIS — F41.9 ANXIETY AND DEPRESSION: Primary | ICD-10-CM

## 2022-10-08 DIAGNOSIS — F90.9 ATTENTION DEFICIT HYPERACTIVITY DISORDER (ADHD), UNSPECIFIED ADHD TYPE: ICD-10-CM

## 2022-10-08 PROCEDURE — 90834 PR PSYCHOTHERAPY W/PATIENT, 45 MIN: ICD-10-PCS | Mod: 95,AJ,HB, | Performed by: SOCIAL WORKER

## 2022-10-08 PROCEDURE — 90834 PSYTX W PT 45 MINUTES: CPT | Mod: 95,AJ,HB, | Performed by: SOCIAL WORKER

## 2022-10-08 PROCEDURE — 3044F PR MOST RECENT HEMOGLOBIN A1C LEVEL <7.0%: ICD-10-PCS | Mod: CPTII,95,AJ,HB | Performed by: SOCIAL WORKER

## 2022-10-08 PROCEDURE — 3044F HG A1C LEVEL LT 7.0%: CPT | Mod: CPTII,95,AJ,HB | Performed by: SOCIAL WORKER

## 2022-10-08 NOTE — PROCEDURES
Colposcopy    Date/Time: 10/7/2022 8:30 AM  Performed by: Shelli Smith NP  Authorized by: Shelli Smith NP     Consent Done?:  Yes (Verbal) and Yes (Written)  Timeout:Immediately prior to procedure a time out was called to verify the correct patient, procedure, equipment, support staff and site/side marked as required  Prep:Patient was prepped and draped in the usual sterile fashion  Assistants?: Yes    List of assistants:  Logan BLANCHARD was present for the entire procedure.    Colposcopy Site:  Cervix  Position:  Supine   Patient was prepped and draped in the normal sterile fashion.  Acrowhite Lesion? Yes    Transformation Zone Adequate?: Yes    Biopsy?: Yes         Location:  Cervix ((3 00, 7 00 and 11 00))  ECC Performed?: Yes    LEEP Performed?: No    Estimated blood loss (cc):  20   Patient tolerated the procedure well with no immediate complications.   Post-operative instructions were provided for the patient.   Patient was discharged and will follow up if any complications occur     Impression PRESTON 2

## 2022-10-08 NOTE — PROGRESS NOTES
Individual Psychotherapy Follow-Up (PhD/LCSW)    10/8/2022    Site:  Telemed        Due to the nature of this visit type, a virtual visit with synchronous audio and video, each patient to whom this provider administers behavioral health services by telemedicine is: (1) informed of the relationship between the provider and patient and the respective role of any other health care provider with respect to management of the patient; and (2) notified that he or she may decline to receive services by telemedicine and may withdraw from such care at any time.    The patient was informed of the following:     Provider's contact info:  [facility]  [address], [address]  [place] [address]  (Phone) [phone]    If technology issues occur, call office phone: : [phone]  If crisis: Dial 911 or go to nearest Emergency Room (ER)  If questions related to privacy practices: contact [facility] Information Department: [phone]    For security purposes, the pt identified that they were at [address]  [address]  [place] [address] during today's session and contact number is [phone] (home) .    The pt's emergency contact(s) is Extended Emergency Contact Information  Primary Emergency Contact: Caryl Ho  Address: [address]           [address]           [place] [address] [facility]  Home Phone: [phone]  Mobile Phone: [phone]  Relation: Sister  Preferred language: English   needed? No.    Crisis Disclaimer: Patient was informed that due to the virtual nature of the visit, that if a crisis develops, protocols will be implemented to ensure patient safety, including but not limited to: 1) Initiating a welfare check with local Law Enforcement and/or 2) Calling 910    Therapeutic Intervention: Met with patient.  Outpatient - Supportive psychotherapy 45 min - CPT Code 48211    Chief complaint/reason for encounter: attention deficit,  depression, and anxiety                Interval history and content of current session: Pt logged in from home. Casually dressed, in good spirits. She spoke about some difficulties getting her daughter to school on time in the mornings; validated her feelings. We talked about making some changes to things (such as getting things ready the night before). She's having some work challenges; listened and provided support. We talked about coping skills, for example, using humor. Feels that she may need more frequent therapy; let her know LCSW is stepping away from PRN role at [facility]. She has already begun the process of getting help through [facility] for both herself and her son. Provided encouragement and wished her well.     PHQ-9 Depression Patient Health Questionnaire 10/8/2022 8/24/2022   Patient agreed to terms: Yes Yes   Little interest or pleasure in doing things 1 1   Feeling down, depressed, or hopeless 1 1   Trouble falling or staying asleep, or sleeping too much 2 1   Feeling tired or having little energy 1 1   Poor appetite or overeating 1 1   Feeling bad about yourself - or that you are a failure or have let yourself or your family down 1 1   Trouble concentrating on things, such as reading the newspaper or watching television 2 1   Moving or speaking so slowly that other people could have noticed. Or the opposite - being so fidgety or restless that you have been moving around a lot more than usual 0 1   Thoughts that you would be better off dead, or of hurting yourself in some way 0 0   PHQ-9 Total Score 9 8   If you checked off any problems, how difficult have these problems made it for you to do your work, take care of things at home, or get along with other people? Somewhat difficult Somewhat difficult   Interpretation Mild Mild       0-4 = No intervention  5 to 9 = Mild  10 to 14 = Moderate  15 to 19 = Moderately severe  ?20 = Severe    GAD7 10/8/2022   1. Feeling nervous, anxious, or on edge? 3    2. Not being able to stop or control worrying? 2   3. Worrying too much about different things? 2   4. Trouble relaxing? 2   5. Being so restless that it is hard to sit still? 1   6. Becoming easily annoyed or irritable? 3   7. Feeling afraid as if something awful might happen? 1   8. If you checked off any problems, how difficult have these problems made it for you to do your work, take care of things at home, or get along with other people? 1   HALIE-7 Score 14     0-4 = Minimal anxiety  5-9 = Mild anxiety  10-14 = Moderate anxiety  15-21 = Severe anxiety     Treatment plan:  Target symptoms: depression, distractability, lack of focus, anxiety   Why chosen therapy is appropriate versus another modality: relevant to diagnosis, patient responds to this modality, evidence based practice  Outcome monitoring methods: self-report, observation, checklist/rating scale  Therapeutic intervention type: supportive psychotherapy    Risk parameters:  Patient reports no suicidal ideation  Patient reports no homicidal ideation  Patient reports no self-injurious behavior  Patient reports no violent behavior    Verbal deficits: None    Patient's response to intervention:  The patient's response to intervention is accepting.    Progress toward goals and other mental status changes:  The patient's progress toward goals is good.    Diagnosis:    ICD-10-CM ICD-9-CM   1. Anxiety and depression  F41.9 300.00    F32.A 311   2. Attention deficit hyperactivity disorder (ADHD), unspecified ADHD type  F90.9 314.01   3. PTSD (post-traumatic stress disorder)  F43.10 309.81       Plan:  individual psychotherapy and medication management by physician    Return to clinic:  as scheduled to see a different provider    Length of Service (minutes): 45

## 2022-10-12 LAB
FINAL PATHOLOGIC DIAGNOSIS: NORMAL
GROSS: NORMAL
Lab: NORMAL

## 2022-10-14 ENCOUNTER — PROCEDURE VISIT (OUTPATIENT)
Dept: OBSTETRICS AND GYNECOLOGY | Facility: CLINIC | Age: 42
End: 2022-10-14
Payer: MEDICAID

## 2022-10-14 ENCOUNTER — PATIENT MESSAGE (OUTPATIENT)
Dept: OBSTETRICS AND GYNECOLOGY | Facility: CLINIC | Age: 42
End: 2022-10-14

## 2022-10-14 ENCOUNTER — PATIENT MESSAGE (OUTPATIENT)
Dept: OBSTETRICS AND GYNECOLOGY | Facility: CLINIC | Age: 42
End: 2022-10-14
Payer: MEDICAID

## 2022-10-14 VITALS
HEIGHT: 62 IN | DIASTOLIC BLOOD PRESSURE: 72 MMHG | SYSTOLIC BLOOD PRESSURE: 120 MMHG | BODY MASS INDEX: 44.67 KG/M2 | WEIGHT: 242.75 LBS

## 2022-10-14 DIAGNOSIS — R87.619 ATYPICAL GLANDULAR CELLS OF UNDETERMINED SIGNIFICANCE (AGUS) ON CERVICAL PAP SMEAR: Primary | ICD-10-CM

## 2022-10-14 PROCEDURE — 58100 ENDOMETRIAL BIOPSY: ICD-10-PCS | Mod: S$PBB,,, | Performed by: NURSE PRACTITIONER

## 2022-10-14 PROCEDURE — 88305 TISSUE EXAM BY PATHOLOGIST: CPT | Performed by: PATHOLOGY

## 2022-10-14 PROCEDURE — 88305 TISSUE EXAM BY PATHOLOGIST: CPT | Mod: 26,,, | Performed by: PATHOLOGY

## 2022-10-14 PROCEDURE — 58100 BIOPSY OF UTERUS LINING: CPT | Mod: S$PBB,,, | Performed by: NURSE PRACTITIONER

## 2022-10-14 PROCEDURE — 81025 URINE PREGNANCY TEST: CPT | Mod: PBBFAC,PN | Performed by: NURSE PRACTITIONER

## 2022-10-14 PROCEDURE — 88305 TISSUE EXAM BY PATHOLOGIST: ICD-10-PCS | Mod: 26,,, | Performed by: PATHOLOGY

## 2022-10-14 PROCEDURE — 58100 BIOPSY OF UTERUS LINING: CPT | Mod: PBBFAC,PN | Performed by: NURSE PRACTITIONER

## 2022-10-14 NOTE — PROCEDURES
Endometrial biopsy    Date/Time: 10/14/2022 2:30 PM  Performed by: Mariangel Josue NP  Authorized by: Mariangel Josue NP     Consent:     Consent obtained:  Verbal    Consent given by:  Patient    Patient questions answered: yes      Patient agrees, verbalizes understanding, and wants to proceed: yes    Indication:     Indications: QUINTEN    Pre-procedure:     Pre-procedure timeout performed: yes    Procedure:     Procedure: endometrial biopsy with Pipelle      Cervix cleaned and prepped: yes      The cervix was dilated: yes      Uterus sounded: no      Uterus sound depth (cm): even after 2 doses of cytotec unable to pass pipelle further than about 4 cm.    Specimen collected: insufficient sample size      Patient tolerated procedure well with no complications: yes    Comments:     Procedure comments:  Attempted to dilate with os finder, canal finder, and fundus finder  Dilators would not pass further than about 4 cm  Still attempted with pipelle but feel no endometrial tissue  Will set up for u/s and have pt f/u with gyn MD     Upt in office today negative

## 2022-10-17 ENCOUNTER — PATIENT MESSAGE (OUTPATIENT)
Dept: OBSTETRICS AND GYNECOLOGY | Facility: CLINIC | Age: 42
End: 2022-10-17
Payer: MEDICAID

## 2022-10-17 ENCOUNTER — HOSPITAL ENCOUNTER (OUTPATIENT)
Dept: RADIOLOGY | Facility: HOSPITAL | Age: 42
Discharge: HOME | End: 2022-10-17
Attending: NURSE PRACTITIONER
Payer: MEDICAID

## 2022-10-17 DIAGNOSIS — R87.619 ATYPICAL GLANDULAR CELLS OF UNDETERMINED SIGNIFICANCE (AGUS) ON CERVICAL PAP SMEAR: ICD-10-CM

## 2022-10-17 PROCEDURE — 76830 TRANSVAGINAL US NON-OB: CPT | Mod: 26,,, | Performed by: RADIOLOGY

## 2022-10-17 PROCEDURE — 76830 TRANSVAGINAL US NON-OB: CPT | Mod: TC

## 2022-10-17 PROCEDURE — 76830 US PELVIS COMP WITH TRANSVAG NON-OB (XPD): ICD-10-PCS | Mod: 26,,, | Performed by: RADIOLOGY

## 2022-10-17 PROCEDURE — 76856 US PELVIS COMP WITH TRANSVAG NON-OB (XPD): ICD-10-PCS | Mod: 26,,, | Performed by: RADIOLOGY

## 2022-10-17 PROCEDURE — 76856 US EXAM PELVIC COMPLETE: CPT | Mod: 26,,, | Performed by: RADIOLOGY

## 2022-10-21 LAB
FINAL PATHOLOGIC DIAGNOSIS: NORMAL
GROSS: NORMAL
Lab: NORMAL

## 2022-10-28 ENCOUNTER — CLINICAL SUPPORT (OUTPATIENT)
Dept: OBSTETRICS AND GYNECOLOGY | Facility: CLINIC | Age: 42
End: 2022-10-28
Payer: MEDICAID

## 2022-10-28 ENCOUNTER — TELEPHONE (OUTPATIENT)
Dept: DERMATOLOGY | Facility: CLINIC | Age: 42
End: 2022-10-28
Payer: MEDICAID

## 2022-10-28 ENCOUNTER — PATIENT MESSAGE (OUTPATIENT)
Dept: OBSTETRICS AND GYNECOLOGY | Facility: CLINIC | Age: 42
End: 2022-10-28
Payer: MEDICAID

## 2022-10-28 DIAGNOSIS — Z23 NEED FOR HPV VACCINE: Primary | ICD-10-CM

## 2022-10-28 DIAGNOSIS — Z23 NEED FOR HPV VACCINE: ICD-10-CM

## 2022-10-28 PROCEDURE — 99999 PR PBB SHADOW E&M-EST. PATIENT-LVL I: ICD-10-PCS | Mod: PBBFAC,,,

## 2022-10-28 PROCEDURE — 90471 IMMUNIZATION ADMIN: CPT | Mod: PBBFAC

## 2022-10-28 PROCEDURE — 99999 PR PBB SHADOW E&M-EST. PATIENT-LVL I: CPT | Mod: PBBFAC,,,

## 2022-10-28 PROCEDURE — 99211 OFF/OP EST MAY X REQ PHY/QHP: CPT | Mod: PBBFAC

## 2022-10-28 NOTE — PROGRESS NOTES
Patient is here for encounter for Gardasil #1     Verified patient with 2 patient identifiers. Allergies and medications reviewed.   Gardasil #1 given IM to left deltoid using aseptic technique.   No discomfort noted. Patient tolerated well.      Next Gardasil #2 injection scheduled.     Patient advised to wait 15 minutes in lobby to monitor for reaction.   Patient verbalized understanding.

## 2022-11-21 PROBLEM — R87.619 ATYPICAL GLANDULAR CELLS OF UNDETERMINED SIGNIFICANCE (AGUS) ON CERVICAL PAP SMEAR: Status: ACTIVE | Noted: 2022-11-21

## 2022-11-28 DIAGNOSIS — N88.2 CERVICAL STENOSIS (UTERINE CERVIX): ICD-10-CM

## 2022-11-28 RX ORDER — MISOPROSTOL 200 UG/1
TABLET ORAL
Qty: 2 TABLET | Refills: 0 | Status: SHIPPED | OUTPATIENT
Start: 2022-11-28 | End: 2022-12-21

## 2022-12-02 ENCOUNTER — DOCUMENTATION ONLY (OUTPATIENT)
Dept: PREADMISSION TESTING | Facility: HOSPITAL | Age: 42
End: 2022-12-02
Payer: MEDICAID

## 2022-12-02 ENCOUNTER — PATIENT MESSAGE (OUTPATIENT)
Dept: SURGERY | Facility: HOSPITAL | Age: 42
End: 2022-12-02
Payer: MEDICAID

## 2022-12-02 NOTE — PROGRESS NOTES
Pre op instructions reviewed with Ms. Ho per phone.      To confirm, your doctor has instructed you: Surgery is scheduled for 12/16/22.     Pre admit office will call the afternoon prior to surgery between 1PM and 3PM with arrival time.    Surgery will be at [facility],  The address is [address]. [place]  [address].      IMPORTANT INSTRUCTIONS!    Do not eat or drink after 12 midnight, including water. Do not smoke or use chewing tobacco after 12 midnight  OK to brush teeth, but no gum, candy, or mints!      *Take only these medicines with a small swallow of water-morning of surgery*     Cytotec        ____ Stop Aspirin, Ibuprofen, Motrin and Aleve at least 5-7 days before surgery, unless otherwise instructed by your doctor, or the nurse.   You MAY use Tylenol/acetaminophen until day of surgery.      ____  If you take diabetic medication, do NOT take morning of surgery unless instructed by Doctor. Metformin must be stopped 24 hrs prior to surgery time.       ____ Stop taking any Fish Oil supplements or Vitamins at least 5 days prior to surgery, unless instructed otherwise by your Doctor.     Bathing Instructions: The night before surgery and the morning prior to coming to the hospital:    - Shower & rinse your body as usual with anti-bacterial Soap (Dial or Lever 2000)   -Hibiclens (if indicated) use AFTER anti-bacterial soap; 1 packet PM/1 packet in AM on surgical site only   -Do not use hibiclens on your head, face, or genitals.    -Do not wash with anti-bacterial soap after you use the hibiclens.    -Do not shave surgical site 5-7 days prior to surgery.    -Pubic hair 7 days prior to surgery (gyn pt's).      Pediatric patients do not need to use anti-bacterial soap or Hibiclens.             After Bathing:   __ No powder, lotions, creams, or body spray to skin     __No deodorant for any breast procedure, PORT, or upper arm surgery     __ No makeup, mascara, nail polish or artificial  nails       **SURGERY WILL BE CANCELLED IF ARTIFICIAL/NAIL POLISH IS PRESENT!!!**    __ Please remove all piercings and leave all jewelry at home.    **SURGERY WILL BE CANCELLED IF PIERCINGS ARE PRESENT!!!**      __ Dentures, Hearing Aids and Contact Lens need to be removed prior to the start of surgery.      __ Wear clean, loose-fitting clothing. Allow for dressings/bandages/surgical equipment     __ You must have transportation, and they MUST stay the entire time.         [facility] Visitor/Ride Policy:   Only 1 adult allowed (over the age of 18) to accompany you into Pre-op/Recovery Surgery Dept and must stay through the entire length of admission.     Must have a ride home from a responsible adult that you know and trust.      Pediatric Patients are allowed 2 adult visitors.     Medical Transport, Uber or Lyft can only be used if patient has a responsible adult to accompany them during ride home.           Post-Op Instructions: You will receive surgery post-op instructions by your Discharge Nurse prior to going home.     Surgical Site Infection:   Prevention of surgical site infections:   -Keep incisions clean and dry.   -Do not soak/submerge incisions in water until completely healed.   -Do not apply lotions, powders, creams, or deodorants to site.   -Always make sure hands are cleaned with antibacterial soap/ alcohol-based   prior to touching the surgical site.       Signs and symptoms:               -Redness and pain around the area where you had surgery               -Drainage of cloudy fluid from your surgical wound               -Fever over 100.4 or chills     >>>Call Surgeon office/on-call Surgeon if you experience any of these signs & symptoms post-surgery.        *Please Call [facility] Pre-Admissions Department with surgery instruction questions at [phone].     *Insurance Questions, please call [phone] or [phone]    Spoke about pre op process and surgery instructions, verbalized  understanding.

## 2022-12-06 ENCOUNTER — PATIENT MESSAGE (OUTPATIENT)
Dept: SURGERY | Facility: HOSPITAL | Age: 42
End: 2022-12-06
Payer: MEDICAID

## 2022-12-15 ENCOUNTER — TELEPHONE (OUTPATIENT)
Dept: PREADMISSION TESTING | Facility: HOSPITAL | Age: 42
End: 2022-12-15
Payer: MEDICAID

## 2022-12-21 ENCOUNTER — DOCUMENTATION ONLY (OUTPATIENT)
Dept: PREADMISSION TESTING | Facility: HOSPITAL | Age: 42
End: 2022-12-21
Payer: MEDICAID

## 2022-12-21 ENCOUNTER — PATIENT MESSAGE (OUTPATIENT)
Dept: PREADMISSION TESTING | Facility: HOSPITAL | Age: 42
End: 2022-12-21
Payer: MEDICAID

## 2022-12-21 NOTE — TELEPHONE ENCOUNTER
Pre op instructions reviewed with patient per phone.      To confirm, your doctor has instructed you: Surgery is scheduled for 1/12/2023 at [facility].     Pre admit office will call the afternoon prior to surgery between 1PM and 3PM with arrival time.    Surgery will be at [facility],  The address is [address]. [place]  [address].        [facility] Visitor/Ride Policy:   Only 1 adult allowed (over the age of 18) to accompany you into Pre-op/Recovery Surgery Dept and must stay through the entire length of admission.     Must have a ride home from a responsible adult that you know and trust.      Pediatric Patients are allowed 2 adult visitors.     Medical Transport, Uber or Lyft can only be used if patient has a responsible adult to accompany them during ride home.       IMPORTANT INSTRUCTIONS!    Do not eat or drink after 12 midnight, including water. Do not smoke or use chewing tobacco after 12 midnight  OK to brush teeth, but no gum, candy, or mints!      Take only these medicines with a small swallow of water-morning of surgery.       NONE.        __ No makeup, mascara, nail polish or artificial nails      __ Please remove all piercings and leave all jewelry at home.    **SURGERY WILL BE CANCELLED IF PIERCINGS ARE PRESENT!!!**       __Dentures, Hearing Aids and Contact Lens need to be removed prior to the start of surgery.       __ Wear clean, loose-fitting clothing. Allow for dressings/bandages/surgical equipment      __ You must have transportation, and they MUST stay the entire time.        ____ Stop Aspirin, Ibuprofen, Motrin and Aleve at least 5-7 days before surgery, unless otherwise instructed by your doctor, or the nurse.   You MAY use Tylenol/acetaminophen until day of surgery.      ____  If you take diabetic medication, do NOT take morning of surgery unless instructed by Doctor. Metformin must be stopped 24 hrs prior to surgery time.       ____ Stop taking any Fish Oil supplements  or Vitamins at least 5 days prior to surgery, unless instructed otherwise by your Doctor.       Bathing Instructions: The night before surgery and the morning prior to coming to the hospital:    - Shower & rinse your body as usual with anti-bacterial Soap (Dial or Lever 2000)   -Hibiclens (if indicated) use AFTER anti-bacterial soap; 1 packet PM/1 packet in AM on surgical site only   -Do not use hibiclens on your head, face, or genitals.    -Do not wash with anti-bacterial soap after you use the hibiclens.    -Do not shave surgical site 5-7 days prior to surgery.    -Pubic hair 7 days prior to surgery (gyn pt's).      Pediatric patients do not need to use anti-bacterial soap or Hibiclens.             After Bathing:   __ No powder, lotions, creams. perfume to skin     __No deodorant for any breast procedure, PORT, or upper arm surgery          Post-Op Instructions: You will receive surgery post-op instructions by your Discharge Nurse prior to going home.     Surgical Site Infection:   Prevention of surgical site infections:   -Keep incisions clean and dry.   -Do not soak/submerge incisions in water until completely healed.   -Do not apply lotions, powders, creams, or deodorants to site.   -Always make sure hands are cleaned with antibacterial soap/ alcohol-based   prior to touching the surgical site.       Signs and symptoms:               -Redness and pain around the area where you had surgery               -Drainage of cloudy fluid from your surgical wound               -Fever over 100.4 or chills     >>>Call Surgeon office/on-call Surgeon if you experience any of these signs & symptoms post-surgery.        *Please Call [facility] Pre-Admissions Department with surgery instruction questions at [phone].     *Insurance Questions, please call [phone] or [phone]    Spoke about pre op process and surgery instructions, verbalized understanding.

## 2022-12-21 NOTE — PROGRESS NOTES
Pre op instructions reviewed with patient per phone.      To confirm, your doctor has instructed you: Surgery is scheduled for 1/12/2023 at [facility] [place].     Pre admit office will call the afternoon prior to surgery between 1PM and 3PM with arrival time.    Sur[facility] will be at [facility],  The address is [address]. [place]  [address].        [facility] Visitor/Ride Policy:   Only 1 adult allowed (over the age of 18) to accompany you into Pre-op/Recovery Surgery Dept and must stay through the entire length of admission.     Must have a ride home from a responsible adult that you know and trust.      Pediatric Patients are allowed 2 adult visitors.     Medical Transport, Uber or Lyft can only be used if patient has a responsible adult to accompany them during ride home.       IMPORTANT INSTRUCTIONS!    Do not eat or drink after 12 midnight, including water. Do not smoke or use chewing tobacco after 12 midnight  OK to brush teeth, but no gum, candy, or mints!      Take only these medicines with a small swallow of water-morning of surgery.       NONE.        __ No makeup, mascara, nail polish or artificial nails      __ Please remove all piercings and leave all jewelry at home.    **SURGERY WILL BE CANCELLED IF PIERCINGS ARE PRESENT!!!**       __Dentures, Hearing Aids and Contact Lens need to be removed prior to the start of surgery.       __ Wear clean, loose-fitting clothing. Allow for dressings/bandages/surgical equipment      __ You must have transportation, and they MUST stay the entire time.        ____ Stop Aspirin, Ibuprofen, Motrin and Aleve at least 5-7 days before surgery, unless otherwise instructed by your doctor, or the nurse.   You MAY use Tylenol/acetaminophen until day of surgery.      ____  If you take diabetic medication, do NOT take morning of surgery unless instructed by Doctor. Metformin must be stopped 24 hrs prior to surgery time.       ____ Stop taking any Fish Oil supplements  or Vitamins at least 5 days prior to surgery, unless instructed otherwise by your Doctor.       Bathing Instructions: The night before surgery and the morning prior to coming to the hospital:    - Shower & rinse your body as usual with anti-bacterial Soap (Dial or Lever 2000)   -Hibiclens (if indicated) use AFTER anti-bacterial soap; 1 packet PM/1 packet in AM on surgical site only   -Do not use hibiclens on your head, face, or genitals.    -Do not wash with anti-bacterial soap after you use the hibiclens.    -Do not shave surgical site 5-7 days prior to surgery.    -Pubic hair 7 days prior to surgery (gyn pt's).      Pediatric patients do not need to use anti-bacterial soap or Hibiclens.             After Bathing:   __ No powder, lotions, creams. perfume to skin     __No deodorant for any breast procedure, PORT, or upper arm surgery          Post-Op Instructions: You will receive surgery post-op instructions by your Discharge Nurse prior to going home.     Surgical Site Infection:   Prevention of surgical site infections:   -Keep incisions clean and dry.   -Do not soak/submerge incisions in water until completely healed.   -Do not apply lotions, powders, creams, or deodorants to site.   -Always make sure hands are cleaned with antibacterial soap/ alcohol-based   prior to touching the surgical site.       Signs and symptoms:               -Redness and pain around the area where you had surgery               -Drainage of cloudy fluid from your surgical wound               -Fever over 100.4 or chills     >>>Call Surgeon office/on-call Surgeon if you experience any of these signs & symptoms post-surgery.        *Please Call [facility] Pre-Admissions Department with surgery instruction questions at [phone].     *Insurance Questions, please call [phone] or [phone]    Spoke about pre op process and surgery instructions, verbalized understanding.

## 2022-12-26 ENCOUNTER — PATIENT MESSAGE (OUTPATIENT)
Dept: SURGERY | Facility: HOSPITAL | Age: 42
End: 2022-12-26
Payer: MEDICAID

## 2022-12-28 ENCOUNTER — CLINICAL SUPPORT (OUTPATIENT)
Dept: OBSTETRICS AND GYNECOLOGY | Facility: CLINIC | Age: 42
End: 2022-12-28
Payer: MEDICAID

## 2022-12-28 DIAGNOSIS — R87.619 ATYPICAL GLANDULAR CELLS OF UNDETERMINED SIGNIFICANCE (AGUS) ON CERVICAL PAP SMEAR: Primary | ICD-10-CM

## 2022-12-28 PROCEDURE — 99211 OFF/OP EST MAY X REQ PHY/QHP: CPT | Mod: PBBFAC

## 2022-12-28 PROCEDURE — 99999 PR PBB SHADOW E&M-EST. PATIENT-LVL I: CPT | Mod: PBBFAC,,,

## 2022-12-28 PROCEDURE — 99999 PR PBB SHADOW E&M-EST. PATIENT-LVL I: ICD-10-PCS | Mod: PBBFAC,,,

## 2022-12-28 PROCEDURE — 90471 IMMUNIZATION ADMIN: CPT | Mod: PBBFAC

## 2022-12-28 PROCEDURE — 90651 9VHPV VACCINE 2/3 DOSE IM: CPT | Mod: PBBFAC

## 2022-12-28 RX ADMIN — HUMAN PAPILLOMAVIRUS 9-VALENT VACCINE, RECOMBINANT 0.5 ML: 30; 40; 60; 40; 20; 20; 20; 20; 20 INJECTION, SUSPENSION INTRAMUSCULAR at 02:12

## 2022-12-28 NOTE — PROGRESS NOTES
Patient is here for encounter for Gardasil injection    Verified patient with 2 patient identifiers. Allergies and medications reviewed.   Gardasil 0.5 mg/ml given IM to right deltoid using aseptic technique.   No discomfort noted. Patient tolerated well.     Next Gardasil injection scheduled.    Patient advised to wait 15 minutes in lobby to monitor for reaction.   Patient verbalized understanding.

## 2023-01-04 ENCOUNTER — LAB VISIT (OUTPATIENT)
Dept: LAB | Facility: HOSPITAL | Age: 43
End: 2023-01-04
Attending: PHYSICIAN ASSISTANT
Payer: MEDICAID

## 2023-01-04 DIAGNOSIS — N88.2 CERVICAL STENOSIS (UTERINE CERVIX): ICD-10-CM

## 2023-01-04 PROCEDURE — 36415 COLL VENOUS BLD VENIPUNCTURE: CPT | Mod: PN | Performed by: PHYSICIAN ASSISTANT

## 2023-01-04 PROCEDURE — 80048 BASIC METABOLIC PNL TOTAL CA: CPT | Performed by: PHYSICIAN ASSISTANT

## 2023-01-04 PROCEDURE — 85025 COMPLETE CBC W/AUTO DIFF WBC: CPT | Performed by: PHYSICIAN ASSISTANT

## 2023-01-05 ENCOUNTER — PATIENT MESSAGE (OUTPATIENT)
Dept: SURGERY | Facility: HOSPITAL | Age: 43
End: 2023-01-05
Payer: MEDICAID

## 2023-01-05 LAB
ANION GAP SERPL CALC-SCNC: 12 MMOL/L (ref 8–16)
BASOPHILS # BLD AUTO: 0.04 K/UL (ref 0–0.2)
BASOPHILS NFR BLD: 0.7 % (ref 0–1.9)
BUN SERPL-MCNC: 17 MG/DL (ref 6–20)
CALCIUM SERPL-MCNC: 9.6 MG/DL (ref 8.7–10.5)
CHLORIDE SERPL-SCNC: 106 MMOL/L (ref 95–110)
CO2 SERPL-SCNC: 23 MMOL/L (ref 23–29)
CREAT SERPL-MCNC: 0.6 MG/DL (ref 0.5–1.4)
DIFFERENTIAL METHOD: ABNORMAL
EOSINOPHIL # BLD AUTO: 0.2 K/UL (ref 0–0.5)
EOSINOPHIL NFR BLD: 2.7 % (ref 0–8)
ERYTHROCYTE [DISTWIDTH] IN BLOOD BY AUTOMATED COUNT: 14.9 % (ref 11.5–14.5)
EST. GFR  (NO RACE VARIABLE): >60 ML/MIN/1.73 M^2
GLUCOSE SERPL-MCNC: 68 MG/DL (ref 70–110)
HCT VFR BLD AUTO: 37.2 % (ref 37–48.5)
HGB BLD-MCNC: 11.8 G/DL (ref 12–16)
IMM GRANULOCYTES # BLD AUTO: 0.02 K/UL (ref 0–0.04)
IMM GRANULOCYTES NFR BLD AUTO: 0.3 % (ref 0–0.5)
LYMPHOCYTES # BLD AUTO: 2.1 K/UL (ref 1–4.8)
LYMPHOCYTES NFR BLD: 35 % (ref 18–48)
MCH RBC QN AUTO: 24.6 PG (ref 27–31)
MCHC RBC AUTO-ENTMCNC: 31.7 G/DL (ref 32–36)
MCV RBC AUTO: 78 FL (ref 82–98)
MONOCYTES # BLD AUTO: 0.4 K/UL (ref 0.3–1)
MONOCYTES NFR BLD: 6.6 % (ref 4–15)
NEUTROPHILS # BLD AUTO: 3.2 K/UL (ref 1.8–7.7)
NEUTROPHILS NFR BLD: 54.7 % (ref 38–73)
NRBC BLD-RTO: 0 /100 WBC
PLATELET # BLD AUTO: 445 K/UL (ref 150–450)
PMV BLD AUTO: 10.4 FL (ref 9.2–12.9)
POTASSIUM SERPL-SCNC: 4.3 MMOL/L (ref 3.5–5.1)
RBC # BLD AUTO: 4.79 M/UL (ref 4–5.4)
SODIUM SERPL-SCNC: 141 MMOL/L (ref 136–145)
WBC # BLD AUTO: 5.88 K/UL (ref 3.9–12.7)

## 2023-01-06 ENCOUNTER — PATIENT MESSAGE (OUTPATIENT)
Dept: SURGERY | Facility: HOSPITAL | Age: 43
End: 2023-01-06
Payer: MEDICAID

## 2023-01-09 ENCOUNTER — TELEPHONE (OUTPATIENT)
Dept: OBSTETRICS AND GYNECOLOGY | Facility: CLINIC | Age: 43
End: 2023-01-09

## 2023-01-09 ENCOUNTER — PATIENT MESSAGE (OUTPATIENT)
Dept: SURGERY | Facility: HOSPITAL | Age: 43
End: 2023-01-09
Payer: MEDICAID

## 2023-01-09 ENCOUNTER — OFFICE VISIT (OUTPATIENT)
Dept: OBSTETRICS AND GYNECOLOGY | Facility: CLINIC | Age: 43
End: 2023-01-09
Payer: MEDICAID

## 2023-01-09 VITALS
BODY MASS INDEX: 44.99 KG/M2 | HEIGHT: 62 IN | WEIGHT: 244.5 LBS | DIASTOLIC BLOOD PRESSURE: 78 MMHG | SYSTOLIC BLOOD PRESSURE: 130 MMHG

## 2023-01-09 DIAGNOSIS — N88.2 CERVICAL STENOSIS (UTERINE CERVIX): ICD-10-CM

## 2023-01-09 DIAGNOSIS — R87.619 ATYPICAL GLANDULAR CELLS OF UNDETERMINED SIGNIFICANCE (AGUS) ON CERVICAL PAP SMEAR: Primary | ICD-10-CM

## 2023-01-09 PROCEDURE — 3075F PR MOST RECENT SYSTOLIC BLOOD PRESS GE 130-139MM HG: ICD-10-PCS | Mod: CPTII,,, | Performed by: OBSTETRICS & GYNECOLOGY

## 2023-01-09 PROCEDURE — 3008F BODY MASS INDEX DOCD: CPT | Mod: CPTII,,, | Performed by: OBSTETRICS & GYNECOLOGY

## 2023-01-09 PROCEDURE — 3078F PR MOST RECENT DIASTOLIC BLOOD PRESSURE < 80 MM HG: ICD-10-PCS | Mod: CPTII,,, | Performed by: OBSTETRICS & GYNECOLOGY

## 2023-01-09 PROCEDURE — 99999 PR PBB SHADOW E&M-EST. PATIENT-LVL II: CPT | Mod: PBBFAC,,, | Performed by: OBSTETRICS & GYNECOLOGY

## 2023-01-09 PROCEDURE — 99499 UNLISTED E&M SERVICE: CPT | Mod: S$PBB,,, | Performed by: OBSTETRICS & GYNECOLOGY

## 2023-01-09 PROCEDURE — 99212 OFFICE O/P EST SF 10 MIN: CPT | Mod: PBBFAC | Performed by: OBSTETRICS & GYNECOLOGY

## 2023-01-09 PROCEDURE — 99999 PR PBB SHADOW E&M-EST. PATIENT-LVL II: ICD-10-PCS | Mod: PBBFAC,,, | Performed by: OBSTETRICS & GYNECOLOGY

## 2023-01-09 PROCEDURE — 99499 NO LOS: ICD-10-PCS | Mod: S$PBB,,, | Performed by: OBSTETRICS & GYNECOLOGY

## 2023-01-09 PROCEDURE — 3008F PR BODY MASS INDEX (BMI) DOCUMENTED: ICD-10-PCS | Mod: CPTII,,, | Performed by: OBSTETRICS & GYNECOLOGY

## 2023-01-09 PROCEDURE — 1159F PR MEDICATION LIST DOCUMENTED IN MEDICAL RECORD: ICD-10-PCS | Mod: CPTII,,, | Performed by: OBSTETRICS & GYNECOLOGY

## 2023-01-09 PROCEDURE — 3078F DIAST BP <80 MM HG: CPT | Mod: CPTII,,, | Performed by: OBSTETRICS & GYNECOLOGY

## 2023-01-09 PROCEDURE — 1159F MED LIST DOCD IN RCRD: CPT | Mod: CPTII,,, | Performed by: OBSTETRICS & GYNECOLOGY

## 2023-01-09 PROCEDURE — 3075F SYST BP GE 130 - 139MM HG: CPT | Mod: CPTII,,, | Performed by: OBSTETRICS & GYNECOLOGY

## 2023-01-09 RX ORDER — MISOPROSTOL 200 UG/1
200 TABLET ORAL ONCE
Qty: 2 TABLET | Refills: 0 | Status: ON HOLD | OUTPATIENT
Start: 2023-01-09 | End: 2023-01-12 | Stop reason: HOSPADM

## 2023-01-09 RX ORDER — DEXTROSE MONOHYDRATE AND SODIUM CHLORIDE 5; .9 G/100ML; G/100ML
INJECTION, SOLUTION INTRAVENOUS CONTINUOUS
Status: CANCELLED | OUTPATIENT
Start: 2023-01-09

## 2023-01-09 NOTE — PROGRESS NOTES
I have explained the risks, benefits , and alternatives of hysteroscopy with curettage in detail.  The patient voices understanding and all questions have been answered.  The patient agrees to proceed as planned.  Consent forms for the procedure have been reviewed and signed by the patient.

## 2023-01-09 NOTE — H&P
Subjective:       Patient ID: oDrian Ho is a 42 y.o. female.    Chief Complaint:  preop clearance (D and C Hysteroscopy)      History of Present Illness  HPI  Atypical glandular cells on pap with negative cervical biopsies and negative ECC    Inadequate Embx attempt with only endocervical cells noted       GYN & OB History  Patient's last menstrual period was 2022 (exact date).   Date of Last Pap: 2022    OB History    Para Term  AB Living   3 2 2   1 2   SAB IAB Ectopic Multiple Live Births   1       2      # Outcome Date GA Lbr Bradley/2nd Weight Sex Delivery Anes PTL Lv   3 Term 2001 38w0d  3.714 kg (8 lb 3 oz) F  EPI N PRAVIN   2 Term 2000 42w0d  3.856 kg (8 lb 8 oz) M CS-Unspec EPI N PRAVIN      Complications: Failure to Progress in Second Stage   1 SAB              Past Medical History:   Diagnosis Date    Anemia     Anxiety disorder, unspecified      Past Surgical History:   Procedure Laterality Date     SECTION       Family History   Problem Relation Age of Onset    Diabetes Mother     Bipolar disorder Mother     No Known Problems Sister     Diabetes Maternal Grandmother     Cancer Maternal Uncle     Diabetes Paternal Uncle     Heart attack Maternal Grandfather     No Known Problems Paternal Grandfather     No Known Problems Paternal Grandmother      Social History     Tobacco Use    Smoking status: Never    Smokeless tobacco: Never    Tobacco comments:     Quit 10- 15 years ago h/o 1 cigarette a day    Substance Use Topics    Alcohol use: Never     Comment: rarely    Drug use: Never     (Not in a hospital admission)    Review of patient's allergies indicates:   Allergen Reactions    Codeine Hallucinations    Penicillins      Current Outpatient Medications   Medication Sig    ibuprofen (ADVIL,MOTRIN) 800 MG tablet ibuprofen Take 10am No date recorded No form recorded No frequency recorded No route recorded No set duration recorded No set duration amount recorded suspended  No dosage strength recorded No dosage strength units of measure recorded    miSOPROStoL (CYTOTEC) 200 MCG Tab Take 1 tablet (200 mcg total) by mouth once. for 1 dose     No current facility-administered medications for this visit.      Review of Systems  Review of Systems   Constitutional:  Negative for appetite change, chills, fatigue, fever and unexpected weight change.   HENT: Negative.     Eyes:  Negative for visual disturbance.   Respiratory:  Negative for shortness of breath and wheezing.    Cardiovascular:  Negative for chest pain, palpitations and leg swelling.   Gastrointestinal:  Negative for abdominal pain, bloating, blood in stool, constipation, diarrhea, nausea and vomiting.   Endocrine: Negative for hair loss, hot flashes and hypothyroidism.   Genitourinary:  Negative for dysmenorrhea, dyspareunia, dysuria, flank pain, frequency, genital sores, hematuria, menorrhagia, menstrual problem, pelvic pain, urgency, vaginal bleeding, vaginal discharge, urinary incontinence and vaginal odor.   Musculoskeletal:  Negative for back pain, joint swelling and myalgias.   Integumentary:  Negative for rash, hair changes, breast mass, nipple discharge and breast skin changes.   Neurological:  Negative for syncope and headaches.   Hematological:  Negative for adenopathy. Does not bruise/bleed easily.   Psychiatric/Behavioral:  Negative for depression and sleep disturbance. The patient is not nervous/anxious.    Breast: Negative for mass, mastodynia, nipple discharge and skin changes        Objective:    Physical Exam:   Constitutional: She is oriented to person, place, and time. Vital signs are normal. She appears well-developed and well-nourished. No distress.    HENT:   Head: Normocephalic and atraumatic.   Right Ear: External ear normal.   Left Ear: External ear normal.   Nose: Nose normal.    Eyes: Pupils are equal, round, and reactive to light. Conjunctivae are normal.    Neck: Trachea normal. No JVD present. No  thyroid mass and no thyromegaly present.    Cardiovascular:  Normal rate and regular rhythm.             Pulmonary/Chest: Effort normal and breath sounds normal. No respiratory distress.        Abdominal: Soft. Bowel sounds are normal. She exhibits no distension and no mass. There is no abdominal tenderness. No hernia. Hernia confirmed negative in the ventral area, confirmed negative in the right inguinal area and confirmed negative in the left inguinal area.     Genitourinary:    Vagina, uterus and rectum normal.   Rectum:      No external hemorrhoid or abnormal anal tone.   Labial bartholins normal.There is no rash, tenderness or lesion on the right labia. There is no rash, tenderness or lesion on the left labia. Cervix is normal. Right adnexum displays no mass, no tenderness and no fullness. Left adnexum displays no mass, no tenderness and no fullness. No  no vaginal discharge, tenderness, bleeding, rectocele, cystocele or unspecified prolapse of vaginal walls in the vagina.    No foreign body in the vagina.   Cervix exhibits no motion tenderness, no discharge and no friability. Uterus is not deviated, not enlarged and not tender.           Musculoskeletal: Normal range of motion.       Neurological: She is alert and oriented to person, place, and time. She has normal reflexes.    Skin: Skin is warm and dry. She is not diaphoretic.    Psychiatric: She has a normal mood and affect. Her speech is normal and behavior is normal. Thought content normal.        Assessment:        1. Atypical glandular cells of undetermined significance (QUINTEN) on cervical Pap smear    2. Cervical stenosis (uterine cervix)                Plan:      Dorian was seen today for preop clearance.    Diagnoses and all orders for this visit:    Atypical glandular cells of undetermined significance (QUINTEN) on cervical Pap smear  Comments:  Hysteroscopy with curettage     Cervical stenosis (uterine cervix)  -     miSOPROStoL (CYTOTEC) 200 MCG Tab;  Take 1 tablet (200 mcg total) by mouth once. for 1 dose

## 2023-01-09 NOTE — TELEPHONE ENCOUNTER
Contacted patient regarding scheduled appointment with  for Pre-Op. Patient checked in and was arrived for appointment then left. Called patient to see if she was going to come back to clinic for Pre-Op appointment with provider. Patient states she is having extreme anxiety over this procedure and is unsure whether she would like to proceed. Explained to patient it would be best for her to come in to discuss questions or concerns regarding the procedure to put her anxiety at ease with the Provider. Patient states she may come back

## 2023-01-10 ENCOUNTER — ANESTHESIA EVENT (OUTPATIENT)
Dept: SURGERY | Facility: HOSPITAL | Age: 43
End: 2023-01-10
Payer: MEDICAID

## 2023-01-11 NOTE — ANESTHESIA PREPROCEDURE EVALUATION
01/10/2023  Dorian Ho is a 42 y.o., female.      Pre-op Assessment    I have reviewed the Patient Summary Reports.     I have reviewed the Nursing Notes. I have reviewed the NPO Status.   I have reviewed the Medications.     Review of Systems  Anesthesia Hx:  No problems with previous Anesthesia  Denies Family Hx of Anesthesia complications.   Denies Personal Hx of Anesthesia complications.   Social:  Non-Smoker    Hematology/Oncology:  Hematology Normal        Cardiovascular:  Cardiovascular Normal     Pulmonary:  Pulmonary Normal    Renal/:  Renal/ Normal     Hepatic/GI:  Hepatic/GI Normal    Neurological:  Neurology Normal    Endocrine:  Morbid Obesity / BMI > 40  Psych:   anxiety depression          Physical Exam  General: Alert and Oriented    Airway:  Mallampati: II   Mouth Opening: Normal  TM Distance: Normal  Tongue: Normal  Neck ROM: Normal ROM    Dental:  Intact    Chest/Lungs:  Clear to auscultation, Normal Respiratory Rate    Heart:  Rate: Normal  Rhythm: Regular Rhythm        Anesthesia Plan  Type of Anesthesia, risks & benefits discussed:    Anesthesia Type: Gen Supraglottic Airway, Gen Natural Airway  Intra-op Monitoring Plan: Standard ASA Monitors  Post Op Pain Control Plan: multimodal analgesia and IV/PO Opioids PRN  Induction:  IV  Informed Consent: Informed consent signed with the Patient and all parties understand the risks and agree with anesthesia plan.  All questions answered.   ASA Score: 2  Day of Surgery Review of History & Physical: H&P Update referred to the surgeon/provider.    Ready For Surgery From Anesthesia Perspective.     .

## 2023-01-12 ENCOUNTER — TELEPHONE (OUTPATIENT)
Dept: OBSTETRICS AND GYNECOLOGY | Facility: CLINIC | Age: 43
End: 2023-01-12
Payer: MEDICAID

## 2023-01-12 ENCOUNTER — HOSPITAL ENCOUNTER (OUTPATIENT)
Facility: HOSPITAL | Age: 43
Discharge: HOME | End: 2023-01-12
Attending: OBSTETRICS & GYNECOLOGY | Admitting: OBSTETRICS & GYNECOLOGY
Payer: MEDICAID

## 2023-01-12 ENCOUNTER — ANESTHESIA (OUTPATIENT)
Dept: SURGERY | Facility: HOSPITAL | Age: 43
End: 2023-01-12
Payer: MEDICAID

## 2023-01-12 VITALS
OXYGEN SATURATION: 97 % | SYSTOLIC BLOOD PRESSURE: 145 MMHG | RESPIRATION RATE: 19 BRPM | HEIGHT: 64 IN | TEMPERATURE: 98 F | WEIGHT: 244.5 LBS | HEART RATE: 91 BPM | DIASTOLIC BLOOD PRESSURE: 78 MMHG | BODY MASS INDEX: 41.74 KG/M2

## 2023-01-12 DIAGNOSIS — R87.619 ATYPICAL GLANDULAR CELLS OF UNDETERMINED SIGNIFICANCE (AGUS) ON CERVICAL PAP SMEAR: ICD-10-CM

## 2023-01-12 DIAGNOSIS — N88.2 CERVICAL STENOSIS (UTERINE CERVIX): ICD-10-CM

## 2023-01-12 LAB
B-HCG UR QL: NEGATIVE
CTP QC/QA: YES

## 2023-01-12 PROCEDURE — D9220A PRA ANESTHESIA: ICD-10-PCS | Mod: ANES,,, | Performed by: ANESTHESIOLOGY

## 2023-01-12 PROCEDURE — D9220A PRA ANESTHESIA: Mod: ANES,,, | Performed by: ANESTHESIOLOGY

## 2023-01-12 PROCEDURE — 58558 PR HYSTEROSCOPY,W/ENDO BX: ICD-10-PCS | Mod: ,,, | Performed by: OBSTETRICS & GYNECOLOGY

## 2023-01-12 PROCEDURE — 00952 ANES VAG PX HYSTSC&/HSG: CPT | Performed by: OBSTETRICS & GYNECOLOGY

## 2023-01-12 PROCEDURE — D9220A PRA ANESTHESIA: Mod: CRNA,,, | Performed by: NURSE ANESTHETIST, CERTIFIED REGISTERED

## 2023-01-12 PROCEDURE — 63600175 PHARM REV CODE 636 W HCPCS: Performed by: ANESTHESIOLOGY

## 2023-01-12 PROCEDURE — 37000008 HC ANESTHESIA 1ST 15 MINUTES: Performed by: OBSTETRICS & GYNECOLOGY

## 2023-01-12 PROCEDURE — 88305 TISSUE EXAM BY PATHOLOGIST: CPT | Mod: 26,,, | Performed by: PATHOLOGY

## 2023-01-12 PROCEDURE — 63600175 PHARM REV CODE 636 W HCPCS: Performed by: OBSTETRICS & GYNECOLOGY

## 2023-01-12 PROCEDURE — 37000009 HC ANESTHESIA EA ADD 15 MINS: Performed by: OBSTETRICS & GYNECOLOGY

## 2023-01-12 PROCEDURE — 71000033 HC RECOVERY, INTIAL HOUR: Performed by: OBSTETRICS & GYNECOLOGY

## 2023-01-12 PROCEDURE — 36000706: Performed by: OBSTETRICS & GYNECOLOGY

## 2023-01-12 PROCEDURE — 36000707: Performed by: OBSTETRICS & GYNECOLOGY

## 2023-01-12 PROCEDURE — 88305 TISSUE EXAM BY PATHOLOGIST: ICD-10-PCS | Mod: 26,,, | Performed by: PATHOLOGY

## 2023-01-12 PROCEDURE — 25000003 PHARM REV CODE 250: Performed by: NURSE ANESTHETIST, CERTIFIED REGISTERED

## 2023-01-12 PROCEDURE — D9220A PRA ANESTHESIA: ICD-10-PCS | Mod: CRNA,,, | Performed by: NURSE ANESTHETIST, CERTIFIED REGISTERED

## 2023-01-12 PROCEDURE — 63600175 PHARM REV CODE 636 W HCPCS: Performed by: NURSE ANESTHETIST, CERTIFIED REGISTERED

## 2023-01-12 PROCEDURE — 58558 HYSTEROSCOPY BIOPSY: CPT | Mod: ,,, | Performed by: OBSTETRICS & GYNECOLOGY

## 2023-01-12 PROCEDURE — 71000015 HC POSTOP RECOV 1ST HR: Performed by: OBSTETRICS & GYNECOLOGY

## 2023-01-12 PROCEDURE — 88305 TISSUE EXAM BY PATHOLOGIST: CPT | Performed by: PATHOLOGY

## 2023-01-12 RX ORDER — ROCURONIUM BROMIDE 10 MG/ML
INJECTION, SOLUTION INTRAVENOUS
Status: DISCONTINUED | OUTPATIENT
Start: 2023-01-12 | End: 2023-01-12

## 2023-01-12 RX ORDER — PROPOFOL 10 MG/ML
VIAL (ML) INTRAVENOUS
Status: DISCONTINUED | OUTPATIENT
Start: 2023-01-12 | End: 2023-01-12

## 2023-01-12 RX ORDER — SODIUM CHLORIDE, SODIUM LACTATE, POTASSIUM CHLORIDE, CALCIUM CHLORIDE 600; 310; 30; 20 MG/100ML; MG/100ML; MG/100ML; MG/100ML
INJECTION, SOLUTION INTRAVENOUS CONTINUOUS
Status: DISCONTINUED | OUTPATIENT
Start: 2023-01-12 | End: 2023-01-12 | Stop reason: HOSPADM

## 2023-01-12 RX ORDER — LIDOCAINE HYDROCHLORIDE 20 MG/ML
INJECTION, SOLUTION EPIDURAL; INFILTRATION; INTRACAUDAL; PERINEURAL
Status: DISCONTINUED | OUTPATIENT
Start: 2023-01-12 | End: 2023-01-12

## 2023-01-12 RX ORDER — ONDANSETRON 2 MG/ML
4 INJECTION INTRAMUSCULAR; INTRAVENOUS ONCE AS NEEDED
Status: COMPLETED | OUTPATIENT
Start: 2023-01-12 | End: 2023-01-12

## 2023-01-12 RX ORDER — ONDANSETRON HYDROCHLORIDE 2 MG/ML
INJECTION, SOLUTION INTRAMUSCULAR; INTRAVENOUS
Status: DISCONTINUED | OUTPATIENT
Start: 2023-01-12 | End: 2023-01-12

## 2023-01-12 RX ORDER — MIDAZOLAM HYDROCHLORIDE 1 MG/ML
INJECTION INTRAMUSCULAR; INTRAVENOUS
Status: DISCONTINUED | OUTPATIENT
Start: 2023-01-12 | End: 2023-01-12

## 2023-01-12 RX ORDER — MEPERIDINE HYDROCHLORIDE 25 MG/ML
12.5 INJECTION INTRAMUSCULAR; INTRAVENOUS; SUBCUTANEOUS ONCE
Status: COMPLETED | OUTPATIENT
Start: 2023-01-12 | End: 2023-01-12

## 2023-01-12 RX ORDER — DEXMEDETOMIDINE HYDROCHLORIDE 100 UG/ML
INJECTION, SOLUTION INTRAVENOUS
Status: DISCONTINUED | OUTPATIENT
Start: 2023-01-12 | End: 2023-01-12

## 2023-01-12 RX ORDER — SUCCINYLCHOLINE CHLORIDE 20 MG/ML
INJECTION INTRAMUSCULAR; INTRAVENOUS
Status: DISCONTINUED | OUTPATIENT
Start: 2023-01-12 | End: 2023-01-12

## 2023-01-12 RX ORDER — FENTANYL CITRATE 50 UG/ML
25 INJECTION, SOLUTION INTRAMUSCULAR; INTRAVENOUS EVERY 5 MIN PRN
Status: DISCONTINUED | OUTPATIENT
Start: 2023-01-12 | End: 2023-01-12 | Stop reason: HOSPADM

## 2023-01-12 RX ORDER — FENTANYL CITRATE 50 UG/ML
INJECTION, SOLUTION INTRAMUSCULAR; INTRAVENOUS
Status: DISCONTINUED | OUTPATIENT
Start: 2023-01-12 | End: 2023-01-12

## 2023-01-12 RX ORDER — KETOROLAC TROMETHAMINE 30 MG/ML
15 INJECTION, SOLUTION INTRAMUSCULAR; INTRAVENOUS EVERY 6 HOURS PRN
Status: DISCONTINUED | OUTPATIENT
Start: 2023-01-12 | End: 2023-01-12 | Stop reason: HOSPADM

## 2023-01-12 RX ORDER — DEXAMETHASONE SODIUM PHOSPHATE 4 MG/ML
INJECTION, SOLUTION INTRA-ARTICULAR; INTRALESIONAL; INTRAMUSCULAR; INTRAVENOUS; SOFT TISSUE
Status: DISCONTINUED | OUTPATIENT
Start: 2023-01-12 | End: 2023-01-12

## 2023-01-12 RX ORDER — ONDANSETRON 8 MG/1
8 TABLET, ORALLY DISINTEGRATING ORAL EVERY 8 HOURS PRN
Status: DISCONTINUED | OUTPATIENT
Start: 2023-01-12 | End: 2023-01-12 | Stop reason: HOSPADM

## 2023-01-12 RX ORDER — DIPHENHYDRAMINE HYDROCHLORIDE 50 MG/ML
25 INJECTION INTRAMUSCULAR; INTRAVENOUS EVERY 6 HOURS PRN
Status: DISCONTINUED | OUTPATIENT
Start: 2023-01-12 | End: 2023-01-12 | Stop reason: HOSPADM

## 2023-01-12 RX ORDER — DEXTROSE MONOHYDRATE AND SODIUM CHLORIDE 5; .9 G/100ML; G/100ML
INJECTION, SOLUTION INTRAVENOUS CONTINUOUS
Status: DISCONTINUED | OUTPATIENT
Start: 2023-01-12 | End: 2023-01-12 | Stop reason: HOSPADM

## 2023-01-12 RX ADMIN — ROCURONIUM BROMIDE 10 MG: 10 INJECTION, SOLUTION INTRAVENOUS at 01:01

## 2023-01-12 RX ADMIN — MIDAZOLAM HYDROCHLORIDE 2 MG: 1 INJECTION INTRAMUSCULAR; INTRAVENOUS at 01:01

## 2023-01-12 RX ADMIN — SODIUM CHLORIDE, POTASSIUM CHLORIDE, SODIUM LACTATE AND CALCIUM CHLORIDE: 600; 310; 30; 20 INJECTION, SOLUTION INTRAVENOUS at 10:01

## 2023-01-12 RX ADMIN — FENTANYL CITRATE 100 MCG: 50 INJECTION, SOLUTION INTRAMUSCULAR; INTRAVENOUS at 01:01

## 2023-01-12 RX ADMIN — ONDANSETRON HYDROCHLORIDE 4 MG: 2 INJECTION, SOLUTION INTRAMUSCULAR; INTRAVENOUS at 02:01

## 2023-01-12 RX ADMIN — KETOROLAC TROMETHAMINE 15 MG: 30 INJECTION, SOLUTION INTRAMUSCULAR at 02:01

## 2023-01-12 RX ADMIN — LIDOCAINE HYDROCHLORIDE 80 MG: 20 INJECTION, SOLUTION EPIDURAL; INFILTRATION; INTRACAUDAL; PERINEURAL at 01:01

## 2023-01-12 RX ADMIN — DEXAMETHASONE SODIUM PHOSPHATE 4 MG: 4 INJECTION, SOLUTION INTRA-ARTICULAR; INTRALESIONAL; INTRAMUSCULAR; INTRAVENOUS; SOFT TISSUE at 02:01

## 2023-01-12 RX ADMIN — ONDANSETRON HYDROCHLORIDE 4 MG: 2 SOLUTION INTRAMUSCULAR; INTRAVENOUS at 03:01

## 2023-01-12 RX ADMIN — FENTANYL CITRATE 50 MCG: 50 INJECTION, SOLUTION INTRAMUSCULAR; INTRAVENOUS at 02:01

## 2023-01-12 RX ADMIN — FENTANYL CITRATE 25 MCG: 50 INJECTION, SOLUTION INTRAMUSCULAR; INTRAVENOUS at 02:01

## 2023-01-12 RX ADMIN — DEXMEDETOMIDINE HYDROCHLORIDE 8 MCG: 100 INJECTION, SOLUTION INTRAVENOUS at 02:01

## 2023-01-12 RX ADMIN — SUCCINYLCHOLINE CHLORIDE 160 MG: 20 INJECTION, SOLUTION INTRAMUSCULAR; INTRAVENOUS at 01:01

## 2023-01-12 RX ADMIN — MEPERIDINE HYDROCHLORIDE 12.5 MG: 25 INJECTION INTRAMUSCULAR; INTRAVENOUS; SUBCUTANEOUS at 02:01

## 2023-01-12 RX ADMIN — PROPOFOL 200 MG: 10 INJECTION, EMULSION INTRAVENOUS at 01:01

## 2023-01-12 NOTE — PLAN OF CARE
Pt prepped for procedure, sister at bedside visiting with pt. No complaints at this time will continue to monitor.

## 2023-01-12 NOTE — ANESTHESIA POSTPROCEDURE EVALUATION
Anesthesia Post Evaluation    Patient: Dorian Ho    Procedure(s) Performed: Procedure(s) (LRB):  D&C HYSTEROSCOPY (N/A)    Final Anesthesia Type: general      Patient location during evaluation: PACU  Patient participation: Yes- Able to Participate  Level of consciousness: awake and alert and oriented  Post-procedure vital signs: reviewed and stable  Pain management: adequate  Airway patency: patent    PONV status at discharge: No PONV  Anesthetic complications: no      Cardiovascular status: blood pressure returned to baseline, stable and hemodynamically stable  Respiratory status: unassisted  Hydration status: euvolemic  Follow-up not needed.          Vitals Value Taken Time   /65 01/12/23 1456   Temp 36.8 °C (98.2 °F) 01/12/23 1433   Pulse 85 01/12/23 1459   Resp 17 01/12/23 1459   SpO2 99 % 01/12/23 1459   Vitals shown include unvalidated device data.      No case tracking events are documented in the log.      Pain/Ezra Score: Pain Rating Prior to Med Admin: 6 (1/12/2023  2:58 PM)  Ezra Score: 9 (1/12/2023  2:33 PM)

## 2023-01-12 NOTE — HPI
Atypical glandular cells on pap with negative cervical biopsies and negative ECC    Inadequate Embx attempt with only endocervical cells noted

## 2023-01-12 NOTE — ANESTHESIA PROCEDURE NOTES
Intubation    Date/Time: 1/12/2023 2:02 PM  Performed by: Syl Lange CRNA  Authorized by: Syl Lange CRNA     Intubation:     Induction:  Intravenous    Intubated:  Postinduction    Mask Ventilation:  Easy mask    Attempts:  1    Attempted By:  CRNA    Method of Intubation:  Direct    Blade:  Kulkarni 2    Laryngeal View Grade: Grade I - full view of cords      Difficult Airway Encountered?: No      Complications:  None    Airway Device:  Oral endotracheal tube    Airway Device Size:  7.0    Style/Cuff Inflation:  Cuffed    Inflation Amount (mL):  6    Tube secured:  21    Secured at:  The lips    Placement Verified By:  Capnometry and Revisualization with laryngoscopy (Bilateral Breath Sounds)    Complicating Factors:  None    Findings Post-Intubation:  BS equal bilateral and atraumatic/condition of teeth unchanged

## 2023-01-12 NOTE — TRANSFER OF CARE
"Anesthesia Transfer of Care Note    Patient: Dorian Ho    Procedure(s) Performed: Procedure(s) (LRB):  D&C HYSTEROSCOPY (N/A)    Patient location: PACU    Anesthesia Type: general    Transport from OR: Transported from OR on room air with adequate spontaneous ventilation    Post pain: adequate analgesia    Post assessment: no apparent anesthetic complications    Post vital signs: stable    Level of consciousness: awake    Nausea/Vomiting: no nausea/vomiting    Complications: none    Transfer of care protocol was followed      Last vitals:   Visit Vitals  BP (!) 164/78 (BP Location: Left arm, Patient Position: Sitting)   Pulse 78   Temp 36.8 °C (98.2 °F) (Temporal)   Resp 16   Ht 5' 4" (1.626 m)   Wt 110.9 kg (244 lb 7.8 oz)   LMP 12/26/2022 (Exact Date)   SpO2 100%   Breastfeeding No   BMI 41.97 kg/m²     "

## 2023-01-12 NOTE — H&P
[facility]  Obstetrics & Gynecology  Pre op note     Patient Name: Dorian Ho  MRN: [id number]  Admission Date: 1/12/2023  Primary Care Provider: Primary Doctor No    Subjective:     Chief Complaint/Reason for Admission: atypical pap     History of Present Illness:  Atypical glandular cells on pap with negative cervical biopsies and negative ECC    Inadequate Embx attempt with only endocervical cells noted       No new subjective & objective note has been filed under this hospital service since the last note was generated.    Assessment/Plan:     Atypical glandular cells of undetermined significance (QUINTEN) on cervical Pap smear  Hysteroscopy with curettage         F Lam Murillo MD  Obstetrics & Gynecology  [facility]

## 2023-01-12 NOTE — TELEPHONE ENCOUNTER
----- Message from Shyanne Stevens sent at 1/12/2023  7:53 AM CST -----  Contact: Dorian Beard is calling in regards to what she have to do before her procedure.please call back at [phone]  \    Thanks  TANA

## 2023-01-12 NOTE — OP NOTE
[facility]  Surgery Department  Operative Note    SUMMARY     Date of Procedure: 1/12/2023     Procedure: Procedure(s) (LRB):  D&C HYSTEROSCOPY (N/A)     Surgeon(s) and Role:     * LAURA Murillo MD - Primary    Assisting Surgeon: None    Pre-Operative Diagnosis: Cervical stenosis (uterine cervix) [N88.2]    Post-Operative Diagnosis: Post-Op Diagnosis Codes:     * Cervical stenosis (uterine cervix) [N88.2]    Anesthesia: General/MAC    Operative Findings (including complications, if any):     Description of Technical Procedures:      INTRAOPERATIVE FINDINGS:  The patient's overall endometrial canal was visualized.  Some excess tissue noted in the lower segment   The fallopian ostia are noted bilaterally.                                                                                                                                                    DESCRIPTION OF PROCEDURE:  The patient was.                                                                      placed in the dorsal lithotomy position with her legs in       The Boby stirrups                                                                     Her peritoneum was then prepped and draped in the normal    sterile fashion and her bladder was drained in and out fashion.  A           weighted sterile speculum was then placed in the patient's vagina.                                                                      Anterior lip of the cervix was grasped with a single-tooth tenaculum.                                                                        The  Cervix was then dilated with the Hegar dilators up to about 7 mm    .  A Zero degree      hysteroscope was then advanced through the cervix into the intrauterine      cavity using normal saline as a distention medium.  Examination of the       entire uterine cavity was performed.  Panhysteroscopic view achieved,  bilateral tubal ostia were         Visualized.      Curettage  performed with sharp curette.    Single-tooth tenaculum was then removed from the patient's cervix and        good hemostasis was noted at the tenaculum site                                                                        All other instruments were      removed from the vagina.    The patient tolerated the procedure well.     Total fluid in was within 100 cc of total fluid out       Significant Surgical Tasks Conducted by the Assistant(s), if Applicable: none     Estimated Blood Loss (EBL): 10cc           Implants: * No implants in log *    Specimens:       Endometrial scraping            Condition: Good    Disposition: PACU - hemodynamically stable.    Attestation: I was present and scrubbed for the entire procedure.

## 2023-01-16 DIAGNOSIS — D50.9 MICROCYTIC ANEMIA: Primary | ICD-10-CM

## 2023-01-16 RX ORDER — FERROUS SULFATE 325(65) MG
325 TABLET ORAL
Qty: 90 TABLET | Refills: 0 | Status: SHIPPED | OUTPATIENT
Start: 2023-01-16 | End: 2023-04-16

## 2023-01-16 NOTE — PROGRESS NOTES
Abnormal lab result, lab shows microcytic anemia due to iron def. Rx have been sent to the pharmacy for iron supplement. Continue to take till your next visit.

## 2023-01-17 ENCOUNTER — TELEPHONE (OUTPATIENT)
Dept: PRIMARY CARE CLINIC | Facility: CLINIC | Age: 43
End: 2023-01-17
Payer: MEDICAID

## 2023-01-17 NOTE — TELEPHONE ENCOUNTER
Patient called regarding labs. Patient was informed of results and provider orders. Patient voiced understanding.  ----- Message from Melissa Philippe MD sent at 1/16/2023  8:29 AM CST -----  Abnormal lab result, lab shows microcytic anemia due to iron def. Rx have been sent to the pharmacy for iron supplement. Continue to take till your next visit.

## 2023-01-18 LAB
FINAL PATHOLOGIC DIAGNOSIS: NORMAL
Lab: NORMAL

## 2023-02-20 ENCOUNTER — OFFICE VISIT (OUTPATIENT)
Dept: OBSTETRICS AND GYNECOLOGY | Facility: CLINIC | Age: 43
End: 2023-02-20
Payer: MEDICAID

## 2023-02-20 VITALS
BODY MASS INDEX: 41.21 KG/M2 | DIASTOLIC BLOOD PRESSURE: 82 MMHG | HEIGHT: 64 IN | SYSTOLIC BLOOD PRESSURE: 146 MMHG | WEIGHT: 241.38 LBS

## 2023-02-20 DIAGNOSIS — Z87.898 NO POST-OP COMPLICATIONS: Primary | ICD-10-CM

## 2023-02-20 PROCEDURE — 1159F PR MEDICATION LIST DOCUMENTED IN MEDICAL RECORD: ICD-10-PCS | Mod: CPTII,,, | Performed by: OBSTETRICS & GYNECOLOGY

## 2023-02-20 PROCEDURE — 99024 PR POST-OP FOLLOW-UP VISIT: ICD-10-PCS | Mod: ,,, | Performed by: OBSTETRICS & GYNECOLOGY

## 2023-02-20 PROCEDURE — 99213 OFFICE O/P EST LOW 20 MIN: CPT | Mod: PBBFAC | Performed by: OBSTETRICS & GYNECOLOGY

## 2023-02-20 PROCEDURE — 3079F DIAST BP 80-89 MM HG: CPT | Mod: CPTII,,, | Performed by: OBSTETRICS & GYNECOLOGY

## 2023-02-20 PROCEDURE — 99999 PR PBB SHADOW E&M-EST. PATIENT-LVL III: CPT | Mod: PBBFAC,,, | Performed by: OBSTETRICS & GYNECOLOGY

## 2023-02-20 PROCEDURE — 1159F MED LIST DOCD IN RCRD: CPT | Mod: CPTII,,, | Performed by: OBSTETRICS & GYNECOLOGY

## 2023-02-20 PROCEDURE — 3079F PR MOST RECENT DIASTOLIC BLOOD PRESSURE 80-89 MM HG: ICD-10-PCS | Mod: CPTII,,, | Performed by: OBSTETRICS & GYNECOLOGY

## 2023-02-20 PROCEDURE — 99999 PR PBB SHADOW E&M-EST. PATIENT-LVL III: ICD-10-PCS | Mod: PBBFAC,,, | Performed by: OBSTETRICS & GYNECOLOGY

## 2023-02-20 PROCEDURE — 3077F SYST BP >= 140 MM HG: CPT | Mod: CPTII,,, | Performed by: OBSTETRICS & GYNECOLOGY

## 2023-02-20 PROCEDURE — 3008F PR BODY MASS INDEX (BMI) DOCUMENTED: ICD-10-PCS | Mod: CPTII,,, | Performed by: OBSTETRICS & GYNECOLOGY

## 2023-02-20 PROCEDURE — 3008F BODY MASS INDEX DOCD: CPT | Mod: CPTII,,, | Performed by: OBSTETRICS & GYNECOLOGY

## 2023-02-20 PROCEDURE — 3077F PR MOST RECENT SYSTOLIC BLOOD PRESSURE >= 140 MM HG: ICD-10-PCS | Mod: CPTII,,, | Performed by: OBSTETRICS & GYNECOLOGY

## 2023-02-20 PROCEDURE — 99024 POSTOP FOLLOW-UP VISIT: CPT | Mod: ,,, | Performed by: OBSTETRICS & GYNECOLOGY

## 2023-02-20 NOTE — PROGRESS NOTES
"Subjective:      Dorian Ho is a 42 y.o. female who presents to the clinic 4 weeks status post operative hysteroscopy for  brunilda pap with cervical stenosis  . Eating a regular diet without difficulty. Bowel movements are normal. The patient is not having any pain.    The following portions of the patient's history were reviewed and updated as appropriate: allergies, current medications, past family history, past medical history, past social history, past surgical history, and problem list.    Review of Systems  Pertinent items are noted in HPI.      Objective:     BP (!) 146/82   Ht 5' 4" (1.626 m)   Wt 109.5 kg (241 lb 6.5 oz)   LMP 02/13/2023 (Exact Date)   BMI 41.44 kg/m²   General:  alert, appears stated age, and cooperative   Abdomen: soft, bowel sounds active, non-tender         Assessment:      Doing well postoperatively.  Operative findings again reviewed. Pathology report discussed.      Plan:      1. Continue any current medications.  2. Wound care discussed.  3. Activity restrictions: none  4. Anticipated return to work: now.  5. Follow up: 1 year for pap   "

## 2023-02-27 ENCOUNTER — PATIENT MESSAGE (OUTPATIENT)
Dept: PRIMARY CARE CLINIC | Facility: CLINIC | Age: 43
End: 2023-02-27
Payer: MEDICAID

## 2023-03-13 ENCOUNTER — PATIENT MESSAGE (OUTPATIENT)
Dept: PAIN MEDICINE | Facility: CLINIC | Age: 43
End: 2023-03-13
Payer: MEDICAID

## 2023-04-17 ENCOUNTER — PATIENT MESSAGE (OUTPATIENT)
Dept: PRIMARY CARE CLINIC | Facility: CLINIC | Age: 43
End: 2023-04-17
Payer: MEDICAID

## 2023-04-26 ENCOUNTER — OFFICE VISIT (OUTPATIENT)
Dept: PRIMARY CARE CLINIC | Facility: CLINIC | Age: 43
End: 2023-04-26
Payer: MEDICAID

## 2023-04-26 DIAGNOSIS — F32.A ANXIETY AND DEPRESSION: ICD-10-CM

## 2023-04-26 DIAGNOSIS — F43.10 PTSD (POST-TRAUMATIC STRESS DISORDER): ICD-10-CM

## 2023-04-26 DIAGNOSIS — R03.0 ELEVATED BLOOD PRESSURE READING IN OFFICE WITHOUT DIAGNOSIS OF HYPERTENSION: Primary | ICD-10-CM

## 2023-04-26 DIAGNOSIS — F41.9 ANXIETY AND DEPRESSION: ICD-10-CM

## 2023-04-26 PROCEDURE — 99214 OFFICE O/P EST MOD 30 MIN: CPT | Mod: 95,,, | Performed by: FAMILY MEDICINE

## 2023-04-26 PROCEDURE — 1159F PR MEDICATION LIST DOCUMENTED IN MEDICAL RECORD: ICD-10-PCS | Mod: CPTII,95,, | Performed by: FAMILY MEDICINE

## 2023-04-26 PROCEDURE — 1159F MED LIST DOCD IN RCRD: CPT | Mod: CPTII,95,, | Performed by: FAMILY MEDICINE

## 2023-04-26 PROCEDURE — 1160F RVW MEDS BY RX/DR IN RCRD: CPT | Mod: CPTII,95,, | Performed by: FAMILY MEDICINE

## 2023-04-26 PROCEDURE — 1160F PR REVIEW ALL MEDS BY PRESCRIBER/CLIN PHARMACIST DOCUMENTED: ICD-10-PCS | Mod: CPTII,95,, | Performed by: FAMILY MEDICINE

## 2023-04-26 PROCEDURE — 99214 PR OFFICE/OUTPT VISIT, EST, LEVL IV, 30-39 MIN: ICD-10-PCS | Mod: 95,,, | Performed by: FAMILY MEDICINE

## 2023-04-26 NOTE — PROGRESS NOTES
Subjective:    The patient location is: [place] at home  Visit type: Virtual visit with synchronous audio and video  Total time spent with patient:30 mins  This includes face to face time and non-face to face time preparing to see the patient (eg, review of tests), obtaining and/or reviewing separately obtained history, documenting clinical information in the electronic or other health record, independently interpreting results and communicating results to the patient/family/caregiver, or care coordinator.   Each patient to whom he or she provides medical services by telemedicine is:  (1) informed of the relationship between the physician and patient and the respective role of any other health care provider with respect to management of the patient; and (2) notified that he or she may decline to receive medical services by telemedicine and may withdraw from such care at any time.   Patient ID: Dorian Ho is a 42 y.o. female.    Chief Complaint: Psych follow up and elevated blood pressure        History of Present Illness:   Dorian Ho 42 y.o. female presents today with     The patient's last visit with me was on 8/3/2022.  Since then she has followed up with LCSW here at the [facility] twice and also with  and was diagnosed with ADHD, PTSD, anxiety and depression. She was working with a counselor at , reports that she is not progressing and the visits are too far apart. She has psychosocial-work, finances and handicap child and issues from her childhood that she needs to work on. And the other counselor she could see is already working with her son. So she needs to find a new counselor.  She does not want to be on medication.  She works from home    Her last recorded BP was 146/82 and she does not check it at home and has no hx of HTN. Denies sxs.    Past Medical History:   Diagnosis Date    Anemia     Anxiety disorder, unspecified      Family History   Problem Relation Age of Onset    Diabetes Mother     Bipolar  disorder Mother     No Known Problems Sister     Diabetes Maternal Grandmother     Cancer Maternal Uncle     Diabetes Paternal Uncle     Heart attack Maternal Grandfather     No Known Problems Paternal Grandfather     No Known Problems Paternal Grandmother      Social History     Socioeconomic History    Marital status: Single   Tobacco Use    Smoking status: Never    Smokeless tobacco: Never    Tobacco comments:     Quit 10- 15 years ago h/o 1 cigarette a day    Substance and Sexual Activity    Alcohol use: Never     Comment: rarely    Drug use: Never    Sexual activity: Yes     Partners: Male     Birth control/protection: Condom     Outpatient Encounter Medications as of 4/26/2023   Medication Sig Dispense Refill    ibuprofen (ADVIL,MOTRIN) 800 MG tablet ibuprofen Take 10am No date recorded No form recorded No frequency recorded No route recorded No set duration recorded No set duration amount recorded suspended No dosage strength recorded No dosage strength units of measure recorded       No facility-administered encounter medications on file as of 4/26/2023.       Review of Systems   Constitutional:  Negative for activity change and unexpected weight change.   HENT:  Negative for hearing loss, rhinorrhea and trouble swallowing.    Eyes:  Negative for discharge and visual disturbance.   Respiratory:  Negative for chest tightness and wheezing.    Cardiovascular:  Negative for chest pain and palpitations.   Gastrointestinal:  Negative for blood in stool, constipation, diarrhea and vomiting.   Endocrine: Negative for polydipsia and polyuria.   Genitourinary:  Positive for menstrual problem. Negative for difficulty urinating, dysuria and hematuria.   Musculoskeletal:  Negative for arthralgias, joint swelling and neck pain.   Neurological:  Negative for weakness and headaches.   Psychiatric/Behavioral:  Positive for dysphoric mood. Negative for confusion.           A complete 10 point ROS was completed and are  "positive as per above HPI.    Otherwise negative for fever, diplopia, chest pain, shortness of breath, vomiting, blood in urine, joint pain, skin rash, seizures and unusual bleeding.     Objective:      There were no vitals taken for this visit.  Physical Exam  Psychiatric:         Attention and Perception: Attention normal.         Speech: Speech normal.         Behavior: Behavior is cooperative.         Thought Content: Thought content does not include suicidal ideation. Thought content does not include homicidal or suicidal plan.      Comments: frustrated       CONSTITUTIONAL: No apparent distress. Appears comfortable. Does not appear acutely ill or septic. Appears adequately hydrated.  CARDIOVASCULAR: No perioral cyanosis  PULMONARY: Breathing unlabored. No retractions Chest expansion grossly normal.  PSYCHIATRIC: Alert and conversant and grossly oriented. Mood is grossly neutral. Affect appropriate. Judgment and insight grossly intact.  NEUROLOGIC: No focal sensory deficits reported.   Results for orders placed or performed during the hospital encounter of 01/12/23   Specimen to Pathology, Surgery Gynecology and Obstetrics   Result Value Ref Range    Final Pathologic Diagnosis       [facility] DIAGNOSIS:  ENDOMETRIUM:  - Proliferative phase endometrium, negative for hyperplasia.  - Fragments of benign polypoid endocervical mucosa, suggestive of polyp.  - Negative for malignancy.  Teja Brownlee M.D.  Report attached.  Performing site:  [address] [address]  "Disclaimer:  This case diagnosis was rendered completely by the outside  consultation pathologist and the case is electronically signed by an [facility]  pathologist listed below solely to release the report into the medical  record."      Disclaimer       Unless the case is a 'gross only' or additional testing only, the final  diagnosis for each specimen is based on a microscopic examination of  appropriate tissue sections.   "     Assessment:       1. Elevated blood pressure reading in office without diagnosis of hypertension    2. PTSD (post-traumatic stress disorder)    3. Anxiety and depression        Plan:   Elevated blood pressure reading in office without diagnosis of hypertension  Comments:  new, RTC for BP check, monitor at home, dash diet    PTSD (post-traumatic stress disorder)  Comments:  not controlled  Orders:  -     Ambulatory referral/consult to Primary Care Behavioral Health (Non-Opioids); Future; Expected date: 05/03/2023    Anxiety and depression  Comments:  not controlled, LCSW referral   Orders:  -     Ambulatory referral/consult to Primary Care Behavioral Health (Non-Opioids); Future; Expected date: 05/03/2023    I have reviewed all of the patient's clinical history available in care everywhere and Epic and have utilized this in my evaluation and management recommendations today.     Treatment options and alternatives were discussed with the patient. Patient was given ample time to ask questions. All questions were answered. Voices understanding and acceptance of this advice. Will call back if any further questions or concerns.    Melissa Philippe MD

## 2023-04-28 ENCOUNTER — CLINICAL SUPPORT (OUTPATIENT)
Dept: OBSTETRICS AND GYNECOLOGY | Facility: CLINIC | Age: 43
End: 2023-04-28
Payer: MEDICAID

## 2023-04-28 PROCEDURE — 99211 OFF/OP EST MAY X REQ PHY/QHP: CPT | Mod: PBBFAC

## 2023-04-28 PROCEDURE — 99999 PR PBB SHADOW E&M-EST. PATIENT-LVL I: ICD-10-PCS | Mod: PBBFAC,,,

## 2023-04-28 PROCEDURE — 90471 IMMUNIZATION ADMIN: CPT | Mod: PBBFAC

## 2023-04-28 PROCEDURE — 99999 PR PBB SHADOW E&M-EST. PATIENT-LVL I: CPT | Mod: PBBFAC,,,

## 2023-04-28 NOTE — PROGRESS NOTES
After using two patient identifiers and reviewing allergies and medications. Pt. Received third dose of Gardisil. Instructed to wait 15 minutes.

## 2023-10-05 ENCOUNTER — PATIENT MESSAGE (OUTPATIENT)
Dept: PRIMARY CARE CLINIC | Facility: CLINIC | Age: 43
End: 2023-10-05
Payer: MEDICAID

## (undated) DEVICE — PAD ABD 8X10 STERILE

## (undated) DEVICE — TOWEL OR DISP STRL BLUE 4/PK

## (undated) DEVICE — SET CYSTO IRRIGATION UNIV SPIK

## (undated) DEVICE — UNDERGLOVES BIOGEL PI SZ 6 LF

## (undated) DEVICE — ELECTRODE REM PLYHSV RETURN 9

## (undated) DEVICE — DRESSING TELFA N ADH 3X8

## (undated) DEVICE — PACK BASIC SETUP SC BR

## (undated) DEVICE — TRAY SKIN SCRUB WET PREMIUM

## (undated) DEVICE — DRAPE UINDERBUT GRAD PCH

## (undated) DEVICE — CATH URETHRAL 16FR RED

## (undated) DEVICE — GLOVE SURGICAL LATEX SZ 6

## (undated) DEVICE — COVER CAMERA OPERATING ROOM

## (undated) DEVICE — MANIFOLD 4 PORT

## (undated) DEVICE — PACK DRAPE PERI/GYN TIBURON

## (undated) DEVICE — CONTAINER SPECIMEN STRL 4OZ

## (undated) DEVICE — SOL NS 1000CC

## (undated) DEVICE — GOWN POLY REINF BRTH SLV XL

## (undated) DEVICE — GLOVE SURG BIOGEL LATEX SZ 7.5